# Patient Record
Sex: MALE | Race: WHITE
[De-identification: names, ages, dates, MRNs, and addresses within clinical notes are randomized per-mention and may not be internally consistent; named-entity substitution may affect disease eponyms.]

---

## 2020-07-02 ENCOUNTER — HOSPITAL ENCOUNTER (INPATIENT)
Dept: HOSPITAL 47 - EC | Age: 80
LOS: 9 days | DRG: 246 | End: 2020-07-11
Attending: HOSPITALIST | Admitting: HOSPITALIST
Payer: MEDICARE

## 2020-07-02 VITALS — BODY MASS INDEX: 28 KG/M2

## 2020-07-02 DIAGNOSIS — T50.995A: ICD-10-CM

## 2020-07-02 DIAGNOSIS — J96.01: ICD-10-CM

## 2020-07-02 DIAGNOSIS — E78.5: ICD-10-CM
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
## 2020-07-02 DIAGNOSIS — Z66: ICD-10-CM

## 2020-07-02 DIAGNOSIS — I34.0: ICD-10-CM

## 2020-07-02 DIAGNOSIS — H91.90: ICD-10-CM

## 2020-07-02 DIAGNOSIS — N17.0: ICD-10-CM

## 2020-07-02 DIAGNOSIS — Z81.1: ICD-10-CM

## 2020-07-02 DIAGNOSIS — I45.10: ICD-10-CM

## 2020-07-02 DIAGNOSIS — Z86.010: ICD-10-CM

## 2020-07-02 DIAGNOSIS — I27.20: ICD-10-CM

## 2020-07-02 DIAGNOSIS — J98.11: ICD-10-CM

## 2020-07-02 DIAGNOSIS — S09.90XA: ICD-10-CM

## 2020-07-02 DIAGNOSIS — Z92.3: ICD-10-CM

## 2020-07-02 DIAGNOSIS — K72.00: ICD-10-CM

## 2020-07-02 DIAGNOSIS — T50.8X5A: ICD-10-CM

## 2020-07-02 DIAGNOSIS — Z87.891: ICD-10-CM

## 2020-07-02 DIAGNOSIS — I44.2: ICD-10-CM

## 2020-07-02 DIAGNOSIS — R40.2114: ICD-10-CM

## 2020-07-02 DIAGNOSIS — I25.10: ICD-10-CM

## 2020-07-02 DIAGNOSIS — I46.2: ICD-10-CM

## 2020-07-02 DIAGNOSIS — Z97.4: ICD-10-CM

## 2020-07-02 DIAGNOSIS — Z11.59: ICD-10-CM

## 2020-07-02 DIAGNOSIS — Z51.5: ICD-10-CM

## 2020-07-02 DIAGNOSIS — I25.3: ICD-10-CM

## 2020-07-02 DIAGNOSIS — T44.5X5A: ICD-10-CM

## 2020-07-02 DIAGNOSIS — Q21.0: ICD-10-CM

## 2020-07-02 DIAGNOSIS — E87.1: ICD-10-CM

## 2020-07-02 DIAGNOSIS — I21.19: Primary | ICD-10-CM

## 2020-07-02 DIAGNOSIS — Z95.5: ICD-10-CM

## 2020-07-02 DIAGNOSIS — R19.7: ICD-10-CM

## 2020-07-02 DIAGNOSIS — I23.3: ICD-10-CM

## 2020-07-02 DIAGNOSIS — E87.6: ICD-10-CM

## 2020-07-02 DIAGNOSIS — I25.5: ICD-10-CM

## 2020-07-02 DIAGNOSIS — Z79.899: ICD-10-CM

## 2020-07-02 DIAGNOSIS — E86.1: ICD-10-CM

## 2020-07-02 DIAGNOSIS — Z85.46: ICD-10-CM

## 2020-07-02 DIAGNOSIS — J18.9: ICD-10-CM

## 2020-07-02 DIAGNOSIS — I48.19: ICD-10-CM
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
## 2020-07-02 DIAGNOSIS — F03.90: ICD-10-CM

## 2020-07-02 DIAGNOSIS — Z88.0: ICD-10-CM

## 2020-07-02 DIAGNOSIS — R40.2354: ICD-10-CM

## 2020-07-02 DIAGNOSIS — R00.1: ICD-10-CM

## 2020-07-02 DIAGNOSIS — I50.41: ICD-10-CM

## 2020-07-02 DIAGNOSIS — R57.0: ICD-10-CM

## 2020-07-02 DIAGNOSIS — I11.0: ICD-10-CM

## 2020-07-02 DIAGNOSIS — N40.0: ICD-10-CM

## 2020-07-02 DIAGNOSIS — E87.4: ICD-10-CM

## 2020-07-02 LAB
ALBUMIN SERPL-MCNC: 4.1 G/DL (ref 3.5–5)
ALP SERPL-CCNC: 74 U/L (ref 38–126)
ALT SERPL-CCNC: 73 U/L (ref 4–49)
ANION GAP SERPL CALC-SCNC: 7 MMOL/L
APTT BLD: 25.9 SEC (ref 22–30)
AST SERPL-CCNC: 495 U/L (ref 17–59)
BASOPHILS # BLD AUTO: 0 K/UL (ref 0–0.2)
BASOPHILS NFR BLD AUTO: 0 %
BUN SERPL-SCNC: 16 MG/DL (ref 9–20)
CALCIUM SPEC-MCNC: 9 MG/DL (ref 8.4–10.2)
CHLORIDE SERPL-SCNC: 105 MMOL/L (ref 98–107)
CO2 SERPL-SCNC: 26 MMOL/L (ref 22–30)
EOSINOPHIL # BLD AUTO: 0.1 K/UL (ref 0–0.7)
EOSINOPHIL NFR BLD AUTO: 1 %
ERYTHROCYTE [DISTWIDTH] IN BLOOD BY AUTOMATED COUNT: 3.97 M/UL (ref 4.3–5.9)
ERYTHROCYTE [DISTWIDTH] IN BLOOD: 12.9 % (ref 11.5–15.5)
GLUCOSE BLD-MCNC: 131 MG/DL (ref 75–99)
GLUCOSE SERPL-MCNC: 133 MG/DL (ref 74–99)
HCT VFR BLD AUTO: 39.5 % (ref 39–53)
HGB BLD-MCNC: 13.5 GM/DL (ref 13–17.5)
INR PPP: 0.9 (ref ?–1.2)
LYMPHOCYTES # SPEC AUTO: 1 K/UL (ref 1–4.8)
LYMPHOCYTES NFR SPEC AUTO: 8 %
MCH RBC QN AUTO: 34 PG (ref 25–35)
MCHC RBC AUTO-ENTMCNC: 34.1 G/DL (ref 31–37)
MCV RBC AUTO: 99.5 FL (ref 80–100)
MONOCYTES # BLD AUTO: 1.1 K/UL (ref 0–1)
MONOCYTES NFR BLD AUTO: 8 %
NEUTROPHILS # BLD AUTO: 10.6 K/UL (ref 1.3–7.7)
NEUTROPHILS NFR BLD AUTO: 82 %
PLATELET # BLD AUTO: 249 K/UL (ref 150–450)
POTASSIUM SERPL-SCNC: 3.9 MMOL/L (ref 3.5–5.1)
PROT SERPL-MCNC: 6.8 G/DL (ref 6.3–8.2)
PT BLD: 9.8 SEC (ref 9–12)
SODIUM SERPL-SCNC: 138 MMOL/L (ref 137–145)
TROPONIN I SERPL-MCNC: 70.3 NG/ML (ref 0–0.03)
WBC # BLD AUTO: 13 K/UL (ref 3.8–10.6)

## 2020-07-02 PROCEDURE — 82805 BLOOD GASES W/O2 SATURATION: CPT

## 2020-07-02 PROCEDURE — 99285 EMERGENCY DEPT VISIT HI MDM: CPT

## 2020-07-02 PROCEDURE — 94640 AIRWAY INHALATION TREATMENT: CPT

## 2020-07-02 PROCEDURE — 93320 DOPPLER ECHO COMPLETE: CPT

## 2020-07-02 PROCEDURE — 93451 RIGHT HEART CATH: CPT

## 2020-07-02 PROCEDURE — 71045 X-RAY EXAM CHEST 1 VIEW: CPT

## 2020-07-02 PROCEDURE — 70450 CT HEAD/BRAIN W/O DYE: CPT

## 2020-07-02 PROCEDURE — 36415 COLL VENOUS BLD VENIPUNCTURE: CPT

## 2020-07-02 PROCEDURE — 83880 ASSAY OF NATRIURETIC PEPTIDE: CPT

## 2020-07-02 PROCEDURE — 87070 CULTURE OTHR SPECIMN AEROBIC: CPT

## 2020-07-02 PROCEDURE — 85610 PROTHROMBIN TIME: CPT

## 2020-07-02 PROCEDURE — 93005 ELECTROCARDIOGRAM TRACING: CPT

## 2020-07-02 PROCEDURE — 83735 ASSAY OF MAGNESIUM: CPT

## 2020-07-02 PROCEDURE — 33210 INSERT ELECTRD/PM CATH SNGL: CPT

## 2020-07-02 PROCEDURE — 82810 BLOOD GASES O2 SAT ONLY: CPT

## 2020-07-02 PROCEDURE — 94003 VENT MGMT INPAT SUBQ DAY: CPT

## 2020-07-02 PROCEDURE — 85025 COMPLETE CBC W/AUTO DIFF WBC: CPT

## 2020-07-02 PROCEDURE — 93306 TTE W/DOPPLER COMPLETE: CPT

## 2020-07-02 PROCEDURE — 02C03ZZ EXTIRPATION OF MATTER FROM CORONARY ARTERY, ONE ARTERY, PERCUTANEOUS APPROACH: ICD-10-PCS

## 2020-07-02 PROCEDURE — 82553 CREATINE MB FRACTION: CPT

## 2020-07-02 PROCEDURE — 82550 ASSAY OF CK (CPK): CPT

## 2020-07-02 PROCEDURE — 36600 WITHDRAWAL OF ARTERIAL BLOOD: CPT

## 2020-07-02 PROCEDURE — 93308 TTE F-UP OR LMTD: CPT

## 2020-07-02 PROCEDURE — 93458 L HRT ARTERY/VENTRICLE ANGIO: CPT

## 2020-07-02 PROCEDURE — 027034Z DILATION OF CORONARY ARTERY, ONE ARTERY WITH DRUG-ELUTING INTRALUMINAL DEVICE, PERCUTANEOUS APPROACH: ICD-10-PCS

## 2020-07-02 PROCEDURE — 4A023N7 MEASUREMENT OF CARDIAC SAMPLING AND PRESSURE, LEFT HEART, PERCUTANEOUS APPROACH: ICD-10-PCS

## 2020-07-02 PROCEDURE — 93325 DOPPLER ECHO COLOR FLOW MAPG: CPT

## 2020-07-02 PROCEDURE — 93312 ECHO TRANSESOPHAGEAL: CPT

## 2020-07-02 PROCEDURE — 94002 VENT MGMT INPAT INIT DAY: CPT

## 2020-07-02 PROCEDURE — B2111ZZ FLUOROSCOPY OF MULTIPLE CORONARY ARTERIES USING LOW OSMOLAR CONTRAST: ICD-10-PCS

## 2020-07-02 PROCEDURE — 85018 HEMOGLOBIN: CPT

## 2020-07-02 PROCEDURE — 84145 PROCALCITONIN (PCT): CPT

## 2020-07-02 PROCEDURE — 82272 OCCULT BLD FECES 1-3 TESTS: CPT

## 2020-07-02 PROCEDURE — 83605 ASSAY OF LACTIC ACID: CPT

## 2020-07-02 PROCEDURE — 84484 ASSAY OF TROPONIN QUANT: CPT

## 2020-07-02 PROCEDURE — 80048 BASIC METABOLIC PNL TOTAL CA: CPT

## 2020-07-02 PROCEDURE — 85027 COMPLETE CBC AUTOMATED: CPT

## 2020-07-02 PROCEDURE — 80053 COMPREHEN METABOLIC PANEL: CPT

## 2020-07-02 PROCEDURE — 5A1223Z PERFORMANCE OF CARDIAC PACING, CONTINUOUS: ICD-10-PCS

## 2020-07-02 PROCEDURE — 85730 THROMBOPLASTIN TIME PARTIAL: CPT

## 2020-07-02 PROCEDURE — 87205 SMEAR GRAM STAIN: CPT

## 2020-07-02 PROCEDURE — 87324 CLOSTRIDIUM AG IA: CPT

## 2020-07-02 RX ADMIN — METOPROLOL TARTRATE SCH MG: 25 TABLET, FILM COATED ORAL at 20:17

## 2020-07-02 RX ADMIN — ATORVASTATIN CALCIUM SCH MG: 80 TABLET, FILM COATED ORAL at 20:17

## 2020-07-02 NOTE — P.HPIM
History of Present Illness


patient is a pleasant 79-year-old male came with complaints of left-sided chest 

pain which started today nonexertional, associated diaphoresis and nausea.  

Patient denied any fever chills cough.  Patient is found to have ST elevations 

in the inferior leads with highly elevated troponin of 70.  Patient was 

emergently taken to Cath Lab had stenting to RCA.  Patient still has mild chest 

pain.  Denied any other significant symptoms








Review of Systems











REVIEW OF SYSTEMS: 


CONSTITUTIONAL: No fever, no malaise, no fatigue. 


HEENT: No recent visual problems or hearing problems. Denied any sore throat. 


CARDIOVASCULAR: No  orthopnea, PND, no palpitations, no syncope. 


PULMONARY: No shortness of breath, no cough, no hemoptysis. 


GASTROINTESTINAL: No diarrhea, no nausea, no vomiting, no abdominal pain. 


NEUROLOGICAL: No headaches, no weakness, no numbness. 


HEMATOLOGICAL: Denies any bleeding or petechiae. 


GENITOURINARY: Denies any burning micturition, frequency, or urgency. 


MUSCULOSKELETAL/RHEUMATOLOGICAL: Denies any joint pain, swelling, or any muscle 

pain. 


ENDOCRINE: Denies any polyuria or polydipsia. 





The rest of the 14-point review of systems is negative.








Past Medical History


Past Medical History: Cancer, Dementia, Hearing Disorder / Deafness, Hy

perlipidemia, Hypertension, Prostate Disorder


Additional Past Medical History / Comment(s): Prostate cancer with radiation 

treatments, benign colon polyps, bilateral hearing aides.


History of Any Multi-Drug Resistant Organisms: None Reported


Past Surgical History: Heart Catheterization With Stent


Additional Past Surgical History / Comment(s): Cystoscopy, colonoscopy with 

benign polypectomy, EGD as a child for foreign object, R elbow fracture with 

surgery, L hand injury with surgery.


Past Anesthesia/Blood Transfusion Reactions: No Reported Reaction


Date of Last Stent Placement:: 7/2/20


Smoking Status: Former smoker





- Past Family History


  ** Father


Additional Family Medical History / Comment(s): Father was an alcoholic.





  ** Mother


Additional Family Medical History / Comment(s): Mother was an alcoholic





Medications and Allergies


                                Home Medications











 Medication  Instructions  Recorded  Confirmed  Type


 


Atorvastatin Calcium [Lipitor] 20 mg PO HS 07/02/20 07/02/20 History


 


Beet Root 1000mg 2,000 mg PO DAILY 07/02/20 07/02/20 History


 


Cholecalciferol [Vitamin D3 (25 5,000 unit PO DAILY 07/02/20 07/02/20 History





Mcg = 1000 Iu)]    


 


Citalopram Hydrobromide [CeleXA] 20 mg PO DAILY 07/02/20 07/02/20 History


 


Donepezil [Aricept] 10 mg PO DAILY 07/02/20 07/02/20 History


 


Magnesium Oxide [Mag-Ox] 800 mg PO DAILY 07/02/20 07/02/20 History


 


Memantine HCl [Memantine HCl ER] 28 mg PO DAILY 07/02/20 07/02/20 History


 


Tamsulosin HCl [Flomax] 0.4 mg PO DAILY 07/02/20 07/02/20 History


 


Vitamin B Complex 1 tab PO DAILY 07/02/20 07/02/20 History


 


amLODIPine [Norvasc] 5 mg PO DAILY 07/02/20 07/02/20 History


 


traZODone  mg PO HS 07/02/20 07/02/20 History








                                    Allergies











Allergy/AdvReac Type Severity Reaction Status Date / Time


 


Penicillins Allergy  Rash/Hives Verified 07/02/20 11:16














Physical Exam


Vitals: 


                                   Vital Signs











  Temp Pulse Resp BP Pulse Ox


 


 07/02/20 11:00   69  16  89/59  93 L


 


 07/02/20 10:45   67  15  89/59  96


 


 07/02/20 10:30   67  20  94/77  94 L


 


 07/02/20 10:15  97.6 F  65  15  81/53  95


 


 07/02/20 07:57  98.4 F  71  18  108/77  94 L








                                Intake and Output











 07/01/20 07/02/20 07/02/20





 22:59 06:59 14:59


 


Intake Total   507


 


Balance   507


 


Intake:   


 


  IV   507


 


    Sheaths   12


 


    Sodium Chloride 0.9% 1,   80





    000 ml @ 75 mls/hr IV .   





    [id number] Count includes the [facility] Rx#:[id number]   


 


Other:   


 


  Weight   71.214 kg

















PHYSICAL EXAMINATION: 





GENERAL: The patient is alert and oriented x3, not in any acute distress. Well 

developed, well nourished. 


HEENT: Pupils are round and equally reacting to light. EOMI. No scleral icterus.

 No conjunctival pallor. Normocephalic, atraumatic. No pharyngeal erythema. No 

thyromegaly. 


CARDIOVASCULAR: S1 and S2 present. No murmurs, rubs, or gallops. 


PULMONARY: Chest is clear to auscultation, no wheezing or crackles. 


ABDOMEN: Soft, nontender, nondistended, normoactive bowel sounds. No palpable 

organomegaly. 


MUSCULOSKELETAL: No joint swelling or deformity.


EXTREMITIES: No cyanosis, clubbing, or pedal edema. 


NEUROLOGICAL: Gross neurological examination did not reveal any focal deficits. 


SKIN: No rashes. 

















Results


CBC & Chem 7: 


                                 07/02/20 08:02





                                 07/02/20 08:02


Labs: 


                  Abnormal Lab Results - Last 24 Hours (Table)











  07/02/20 07/02/20 07/02/20 Range/Units





  08:02 08:02 08:02 


 


WBC  13.0 H    (3.8-10.6)  k/uL


 


RBC  3.97 L    (4.30-5.90)  m/uL


 


Neutrophils #  10.6 H    (1.3-7.7)  k/uL


 


Monocytes #  1.1 H    (0-1.0)  k/uL


 


Glucose   133 H   (74-99)  mg/dL


 


POC Glucose (mg/dL)     (75-99)  mg/dL


 


AST   495 H   (17-59)  U/L


 


ALT   73 H   (4-49)  U/L


 


Total Creatine Kinase    2933 H*  ()  U/L


 


CK-MB (CK-2)    127.0 H  (0.0-2.4)  ng/mL


 


Troponin I    70.300 H*  (0.000-0.034)  ng/mL














  07/02/20 Range/Units





  10:42 


 


WBC   (3.8-10.6)  k/uL


 


RBC   (4.30-5.90)  m/uL


 


Neutrophils #   (1.3-7.7)  k/uL


 


Monocytes #   (0-1.0)  k/uL


 


Glucose   (74-99)  mg/dL


 


POC Glucose (mg/dL)  131 H  (75-99)  mg/dL


 


AST   (17-59)  U/L


 


ALT   (4-49)  U/L


 


Total Creatine Kinase   ()  U/L


 


CK-MB (CK-2)   (0.0-2.4)  ng/mL


 


Troponin I   (0.000-0.034)  ng/mL














Thrombosis Risk Factor Assmnt





- Choose All That Apply


Any of the Below Risk Factors Present?: Yes


Each Factor Represents 1 point: Acute MI


Other Risk Factors: Yes


Each Risk Factor Represents 2 Points: Patient confined to bed, Malignancy


Each Risk Factor Represents 3 Points: Age 75 years or older


Other congenital or acquired thrombophilia - If yes, enter type in comment: No


Thrombosis Risk Factor Assessment Total Risk Factor Score: 8


Thrombosis Risk Factor Assessment Level: High Risk





Assessment and Plan


Plan: 


-acute inferior wall ST elevation myocardial infarction: Patient is status post 

cardiac catheterization stenting of the RCA.  Continue with the dual antip

latelet therapy patient is on low-dose of lisinopril but blood pressure is low 

we'll closely monitor the blood pressure and if it's better patient will receive

 this medication patient will be continued on beta blocker for heart rate can 

tolerate.


-essential hypertension: Patient is presently hypotensive further management as 

mentioned above


-Hyperlipidemia next and having benign prostatic hypertrophy


-Dementia as per the documentation also etiology is not clear

## 2020-07-02 NOTE — CC
CARDIAC CATHETERIZATION REPORT



INDICATION:

Acute inferior wall myocardial infarction.



PROCEDURE NOTE:

After obtaining informed consent, left heart catheterization and coronary angiogram

were performed via the right femoral artery.  The patient has very feeble femoral

pulses bilaterally and distally.  I initially attempted vascular access on the right

femoral artery and after placing the sheath, we were not able to advance the regular

wire across the vessel.  I used a Glidewire, but even the Glidewire we were not able to

pass the Glidewire across the iliac vessels and an angiogram we obtained showed severe

stenosis and hence we attempted vascular access on the left side and we obtained

vascular access without any problems, and we were able to pass the Glidewire all the

way into the ascending aorta.  However, we were not able to advance the right and left

Barbra catheter into the aorta.  On the right side, cath by Interventional

Cardiologist placed a long 23 cm sheath using a stiff Glidewire as support and I

completed the catheterization through that sheath.  The patient throughout this

remained comfortable at rest and did not complain of anything.



FINDINGS:



HEMODYNAMICS:

Central aortic pressure 90/50.



LEFT VENTRICULOGRAM:

Left ventriculogram is not performed.



ANGIOGRAPHIC DATA:

1. RIGHT CORONARY ARTERY:  Right coronary artery is totally occluded in its

    midportion.

2. Left main coronary artery is a normal-sized vessel and is free of stenosis.

    Divides into left anterior descending coronary artery and circumflex coronary

    artery.

3. LEFT ANTERIOR DESCENDING CORONARY ARTERY: LAD and its branches, circumflex coronary

    artery and branches are free of significant stenosis.



CONCLUSION:

Acute occlusion of the right coronary artery.



PLAN:

Patient will undergo angioplasty of the same.  Patient received moderate conscious

sedation.  Total sedation time was 28 minutes.





MMODL / IJN: [id number] / Job#: [id number]

## 2020-07-02 NOTE — PTCA
PERCUTANEOUSTRANS CORORONARY ANGIOGRAPHY



DATE OF SERVICE:

July 2, 2020



PERFORMING PHYSICIAN:

Severo Payne MD.



PROCEDURE PERFORMED:

1. Successful stenting of the mid right coronary artery using 2.75 x 18 mm Xience ANDRES

    with an excellent angiographic results and reduction of stenosis from 100% to 0%.

2. Aspiration thrombectomy from the right coronary artery.

3. Left heart catheterization.



INDICATION:

This is a 79-year-old gentleman with hypertension and dyslipidemia who was brought by

ambulance with acute inferior ST-elevation myocardial infarction.  He underwent an

emergent heart catheterization by Dr. Fry and was found to have an acute total

occlusion of the right coronary artery.  Because of that, an emergent percutaneous

coronary intervention was advised.



APPROACH:

Right common femoral artery.



COMPLICATION:

None.



LEVEL OF SEDATION:

Moderate with sedation length of 27 minutes.

Door to balloon is 56 minutes.



PROCEDURE DESCRIPTION:

Please refer to diagnostic heart catheterization was performed by Dr. Fry earlier

today.



Anticoagulation was initiated using Angiomax.



Subsequently, I did engage the right coronary artery using JR4 guide.  I did cross the

lesion in the mid right coronary artery using a run-through wire.  After that, I did

aspiration thrombectomy from the right coronary artery with the extraction of white

clot.  Subsequently, I did the balloon angioplasty of the right coronary artery using

2.5 x 12 mm balloon where the balloon was inflated under 12 atmospheres for 20 seconds.

After that, I did place 2.75 x 18 mm Xience ANDRES where the stent was positioned under

fluoroscopy guidance and deployed under 18 atmospheres for 20 seconds.  The following

angiogram showed excellent angiographic results and the procedure was completed without

any complication.



After that, I did cross the aortic valve to the left ventricle using 6-Chinese pigtail

catheter.



The procedure was completed without any complication.



POSTPROCEDURE MANAGEMENT:

1. Dual anti-platelet therapy.

2. Risk factors modifications.

3. Assess the lesions in the iliac artery and consider percutaneous transluminal

    angioplasty of bilateral iliacs if the patient is symptomatic.

4. Follow up with the patient.





MMODL / IJN: [id number] / Job#: [id number]

## 2020-07-02 NOTE — CONS
OUMOU Chopra is a 79-year-old gentleman with history of hypertension who presented to

hospital with acute inferior wall myocardial infarction.  The patient stated that this

morning he started having severe chest pressure, moderate to severe intensity that

radiated down both arms. An EKG transmitted by EMS showed evidence of acute inferior

wall myocardial infarction.  He was brought to the cath lab for primary angioplasty.

At the time of my evaluation in the lab, the patient appeared comfortable at rest,

hemodynamically stable, but was still having pain.



Past medical history is significant for hypertension.



He is allergic to PENICILLIN.



He stated that he was on blood pressure medications, but we do not have the list.



FAMILY HISTORY:

Negative for premature coronary artery disease.



SOCIAL HISTORY:

Negative for smoking, EtOH abuse, or drug abuse.



REVIEW OF SYSTEMS:

HEENT is unremarkable.

Cardiac as above.

Respiratory negative.

GI negative.

 negative.

Allergy/Immunology negative.

Skin negative.

Musculoskeletal significant for arthritis.  Psychosocial negative.

Endocrine negative.

CONSTITUTIONAL negative.

Derm negative.

ONCOLOGICAL:  Negative

CNS negative.



Rest of the system review is not relevant.



EXAM:

Comfortable at rest.  Heart rate is 108, blood pressure is 90/50. Respiratory rate is

18.  There is no jugular venous distention.  Carotid upstroke is normal.  There is no

bruit.  Chest exam reveals good air entry bilaterally.  Heart exam reveals first and

second heart sounds.  No gallop.  No murmur.  Abdomen soft. Exam of  extremities did

not reveal any edema.  Peripheral pulses are diminished.



Labs show a hemoglobin of 13.5, platelet count is 249, potassium is 3.9, creatinine is

0.8. His  troponin is 70.  AST is 495, ALT is 73.



ASSESSMENT:

Acute inferior wall myocardial infarction.



PLAN:

Patient will undergo emergent cardiac catheterization.





MMODL / IJN: [id number] / Job#: [id number]

## 2020-07-02 NOTE — ECHOF
Referral Reason:STEMI



MEASUREMENTS

--------

HEIGHT: 170.2 cm

WEIGHT: 71.2 kg

BP: 82/58

RVIDd:   3.6 cm     (< 3.3)

IVSd:   1.2 cm     (0.6 - 1.1)

LVIDd:   5.6 cm     (3.9 - 5.3)

LVPWd:   1.0 cm     (0.6 - 1.1)

IVSs:   1.6 cm

LVIDs:   4.4 cm

LVPWs:   1.2 cm

LA Diam:   3.7 cm     (2.7 - 3.8)

LAESV Index (A-L):   32.07 ml/m

Ao Diam:   4.0 cm     (2.0 - 3.7)

AV Cusp:   2.2 cm     (1.5 - 2.6)

MV EXCURSION:   16.659 mm     (> 18.000)

MV EF SLOPE:   107 mm/s     (70 - 150)

EPSS:   0.8 cm

MV E Nicko:   1.12 m/s

MV DecT:   216 ms

MV A Nicko:   0.96 m/s

MV E/A Ratio:   1.17 

RAP:   15.00 mmHg

RVSP:   42.27 mmHg







FINDINGS

--------

Sinus rhythm.

This was a technically good study.

The left ventricular size is normal.   There is borderline concentric left ventricular hypertrophy.  
 Overall left ventricular systolic function is mildly impaired with, an EF between 45 - 50 %.   Basal
 posterior LV wall motion is hypokinetic.    Basal inferior LV wall motion is hypokinetic.    Basal i
nferoseptal LV wall motion is hypokinetic.

The right ventricle is mildly enlarged.

LA is midly dilated 29-33ml/m2.

The right atrium is normal in size.

Interatrial and interventricular septum intact.

There is mild aortic valve sclerosis.

There is trace mitral regurgitation.

Mild tricuspid regurgitation present.   There is mild pulmonary hypertension.   The right ventricular
 systolic pressure, as measured by Doppler, is 42.27mmHg.

Trace/mild (physiologic)  pulmonic regurgitation.

The aortic root is dilated measuring 4.0cm.

Normal inferior vena cava with less than 50% inspiratory collapse consistent with estimated right atr
ial pressure of 15 mmHg.   The inferior vena cava is mildly dilated.

There is no pericardial effusion.



CONCLUSIONS

--------

1. Sinus rhythm.

2. This was a technically good study.

3. The left ventricular size is normal.

4. There is borderline concentric left ventricular hypertrophy.

5. Overall left ventricular systolic function is mildly impaired with, an EF between 45 - 50 %.

6. Basal posterior LV wall motion is hypokinetic.

7. Basal inferior LV wall motion is hypokinetic.

8. Basal inferoseptal LV wall motion is hypokinetic.

9. The right ventricle is mildly enlarged.

10. LA is midly dilated 29-33ml/m2.

11. The right atrium is normal in size.

12. Interatrial and interventricular septum intact.

13. There is mild aortic valve sclerosis.

14. There is trace mitral regurgitation.

15. Mild tricuspid regurgitation present.

16. There is mild pulmonary hypertension.

17. The right ventricular systolic pressure, as measured by Doppler, is 42.27mmHg.

18. Trace/mild (physiologic)  pulmonic regurgitation.

19. The aortic root is dilated measuring 4.0cm.

20. Normal inferior vena cava with less than 50% inspiratory collapse consistent with estimated right
 atrial pressure of 15 mmHg.

21. The inferior vena cava is mildly dilated.

22. There is no pericardial effusion.





SONOGRAPHER: Samreen Eden RDCS

## 2020-07-03 LAB
ANION GAP SERPL CALC-SCNC: 5 MMOL/L
ANION GAP SERPL CALC-SCNC: 9 MMOL/L
BASOPHILS # BLD AUTO: 0 K/UL (ref 0–0.2)
BASOPHILS NFR BLD AUTO: 0 %
BUN SERPL-SCNC: 27 MG/DL (ref 9–20)
BUN SERPL-SCNC: 37 MG/DL (ref 9–20)
CALCIUM SPEC-MCNC: 8.9 MG/DL (ref 8.4–10.2)
CALCIUM SPEC-MCNC: 9.1 MG/DL (ref 8.4–10.2)
CHLORIDE SERPL-SCNC: 102 MMOL/L (ref 98–107)
CHLORIDE SERPL-SCNC: 103 MMOL/L (ref 98–107)
CO2 SERPL-SCNC: 22 MMOL/L (ref 22–30)
CO2 SERPL-SCNC: 24 MMOL/L (ref 22–30)
EOSINOPHIL # BLD AUTO: 0.1 K/UL (ref 0–0.7)
EOSINOPHIL NFR BLD AUTO: 0 %
ERYTHROCYTE [DISTWIDTH] IN BLOOD BY AUTOMATED COUNT: 4.15 M/UL (ref 4.3–5.9)
ERYTHROCYTE [DISTWIDTH] IN BLOOD BY AUTOMATED COUNT: 4.18 M/UL (ref 4.3–5.9)
ERYTHROCYTE [DISTWIDTH] IN BLOOD: 13 % (ref 11.5–15.5)
ERYTHROCYTE [DISTWIDTH] IN BLOOD: 13.1 % (ref 11.5–15.5)
GLUCOSE SERPL-MCNC: 155 MG/DL (ref 74–99)
GLUCOSE SERPL-MCNC: 188 MG/DL (ref 74–99)
HCT VFR BLD AUTO: 41.8 % (ref 39–53)
HCT VFR BLD AUTO: 43.1 % (ref 39–53)
HGB BLD-MCNC: 13.5 GM/DL (ref 13–17.5)
HGB BLD-MCNC: 14.3 GM/DL (ref 13–17.5)
LYMPHOCYTES # SPEC AUTO: 1.1 K/UL (ref 1–4.8)
LYMPHOCYTES NFR SPEC AUTO: 6 %
MCH RBC QN AUTO: 32.4 PG (ref 25–35)
MCH RBC QN AUTO: 34.2 PG (ref 25–35)
MCHC RBC AUTO-ENTMCNC: 32.2 G/DL (ref 31–37)
MCHC RBC AUTO-ENTMCNC: 33.1 G/DL (ref 31–37)
MCV RBC AUTO: 100.6 FL (ref 80–100)
MCV RBC AUTO: 103.2 FL (ref 80–100)
MONOCYTES # BLD AUTO: 1.5 K/UL (ref 0–1)
MONOCYTES NFR BLD AUTO: 8 %
NEUTROPHILS # BLD AUTO: 16.5 K/UL (ref 1.3–7.7)
NEUTROPHILS NFR BLD AUTO: 84 %
PLATELET # BLD AUTO: 274 K/UL (ref 150–450)
PLATELET # BLD AUTO: 289 K/UL (ref 150–450)
POTASSIUM SERPL-SCNC: 4.2 MMOL/L (ref 3.5–5.1)
POTASSIUM SERPL-SCNC: 4.3 MMOL/L (ref 3.5–5.1)
SODIUM SERPL-SCNC: 131 MMOL/L (ref 137–145)
SODIUM SERPL-SCNC: 134 MMOL/L (ref 137–145)
WBC # BLD AUTO: 18.6 K/UL (ref 3.8–10.6)
WBC # BLD AUTO: 19.6 K/UL (ref 3.8–10.6)

## 2020-07-03 RX ADMIN — ATORVASTATIN CALCIUM SCH MG: 80 TABLET, FILM COATED ORAL at 21:28

## 2020-07-03 RX ADMIN — Medication SCH MG: at 08:18

## 2020-07-03 RX ADMIN — HEPARIN SODIUM SCH UNIT: 5000 INJECTION, SOLUTION INTRAVENOUS; SUBCUTANEOUS at 21:34

## 2020-07-03 RX ADMIN — METOPROLOL TARTRATE SCH MG: 25 TABLET, FILM COATED ORAL at 08:18

## 2020-07-03 RX ADMIN — CITALOPRAM HYDROBROMIDE SCH MG: 20 TABLET ORAL at 08:18

## 2020-07-03 RX ADMIN — TAMSULOSIN HYDROCHLORIDE SCH MG: 0.4 CAPSULE ORAL at 08:19

## 2020-07-03 RX ADMIN — MEMANTINE HYDROCHLORIDE SCH MG: 10 TABLET ORAL at 21:28

## 2020-07-03 RX ADMIN — CLOPIDOGREL BISULFATE SCH MG: 75 TABLET ORAL at 08:18

## 2020-07-03 RX ADMIN — MEMANTINE HYDROCHLORIDE SCH MG: 10 TABLET ORAL at 08:18

## 2020-07-03 RX ADMIN — METOPROLOL TARTRATE SCH MG: 25 TABLET, FILM COATED ORAL at 21:28

## 2020-07-03 NOTE — XR
EXAMINATION TYPE: XR chest 1V portable

 

DATE OF EXAM: 7/3/2020

 

COMPARISON: NONE

 

HISTORY: Redness of breath

 

TECHNIQUE: Single frontal view of the chest is obtained.

 

FINDINGS:  There is diffuse prominence of the interstitium, patchy perihilar airspace disease is note
d. There is no pneumothorax or pleural effusion. There is overlying artifact, cardiac leads. Heart si
ze is at the upper limit of normal. The aorta is dense. Bone mineralization is normal.

 

IMPRESSION:  Findings could represent interstitial and early alveolar edema, correlate to exclude pne
umonia.

## 2020-07-03 NOTE — ECHOF
Referral Reason:Chest pain and cardiomyopathy



MEASUREMENTS

--------

HEIGHT: 170.2 cm

WEIGHT: 68.0 kg

BP: 109/67

IVSd:   1.2 cm     (0.6 - 1.1)

LVIDd:   4.3 cm     (3.9 - 5.3)

LVPWd:   1.5 cm     (0.6 - 1.1)

IVSs:   1.6 cm

LVIDs:   3.3 cm

LVPWs:   1.2 cm







FINDINGS

--------

This was a technically difficult study with suboptimal apical views.   Limited Study

Overall left ventricular systolic function is mildly impaired with, an EF between 45 - 50 %.   Basal 
posterior LV wall motion is hypokinetic.    Basal inferior LV wall motion is hypokinetic.

5.0mg of Lumason was utilized for enhancement of images

There is no pericardial effusion.



CONCLUSIONS

--------

1. This was a technically difficult study with suboptimal apical views.

2. Limited Study

3. Overall left ventricular systolic function is mildly impaired with, an EF between 45 - 50 %.

4. Basal posterior LV wall motion is hypokinetic.

5. Basal inferior LV wall motion is hypokinetic.

6. 5.0mg of Lumason was utilized for enhancement of images

7. There is no pericardial effusion.





SONOGRAPHER: Leyla Winchester RDCS

## 2020-07-03 NOTE — P.PN
Subjective


Progress Note Date: 07/03/20


This is a 79-year-old gentleman with history of hypertension who presented with 

inferior wall myocardial infarction.  Patient had a cardiac catheterization and 

stent placement of the mid RCA.  His CPK was high in the troponin was in the 

range of 77 on admission.  His echo Cardigan showed an ejection fraction of 45%.

 Patient is complaining of some congestion and seemed to be mild short of 

breath.  Chest x-ray showed evidence of congestive heart failure.  Heart 

appeared to be regular.  No peripheral edema.  I'm going to start him on IV 

Lasix 40 mg and start him by mouth Lasix.  We'll also give potassium supplement.

 Rest of the medication to be continued.  We'll continue follow his input and 

output.  Follow blood work tomorrow.  Further condition depend upon the clinical

course.  We'll continue to monitor him in intensive care unit








Objective





- Vital Signs


Vital signs: 


                                   Vital Signs











Temp  98.2 F   07/03/20 04:00


 


Pulse  75   07/03/20 08:00


 


Resp  30 H  07/03/20 08:00


 


BP  97/62   07/03/20 08:00


 


Pulse Ox  93 L  07/03/20 08:00








                                 Intake & Output











 07/02/20 07/03/20 07/03/20





 18:59 06:59 18:59


 


Intake Total 1032 440 


 


Output Total 200 500 


 


Balance 832 -60 


 


Weight 71.214 kg 76.4 kg 


 


Intake:   


 


    


 


    Sheaths 12  


 


    Sodium Chloride 0.9% 1, 455  





    000 ml @ 75 mls/hr IV .   





    [id number] [facility] Rx#:[id number]   


 


  Oral 150 440 


 


Output:   


 


  Urine 200 500 


 


Other:   


 


  Voiding Method Urinal Bedside Commode 


 


  # Voids 1 2 0


 


  # Bowel Movements  2 














- Exam





GENERAL EXAM: Patient is alert and oriented and appears to be in mild 

respiratory distress and cough


HEENT: Normocephalic. Normal reaction of pupils, equal size, normal range of 

extraocular motion. No erythema or exudates in the throat.


NECK: No masses, no nuchal rigidity.


CHEST: No chest wall deformity.


LUNGS: Show expiratory wheezes and rhonchi


HEART: [S1 and S2 normal


ABDOMEN: No hepatosplenomegaly, normal bowel sounds, no guarding or rigidity.


SKIN: No rashes


CENTRAL NERVOUS SYSTEM: No focal deficits.


EXTREMITIES: No cyanosis, clubbing or edema.





- Labs


CBC & Chem 7: 


                                 07/03/20 04:46





                                 07/03/20 04:46


Labs: 


                  Abnormal Lab Results - Last 24 Hours (Table)











  07/02/20 07/03/20 07/03/20 Range/Units





  10:42 04:46 04:46 


 


WBC    18.6 H  (3.8-10.6)  k/uL


 


RBC    4.18 L  (4.30-5.90)  m/uL


 


MCV    103.2 H  (80.0-100.0)  fL


 


Sodium   134 L   (137-145)  mmol/L


 


BUN   27 H   (9-20)  mg/dL


 


Creatinine   1.49 H   (0.66-1.25)  mg/dL


 


Glucose   188 H   (74-99)  mg/dL


 


POC Glucose (mg/dL)  131 H    (75-99)  mg/dL














Assessment and Plan


(1) Acute transmural inferior wall MI


Current Visit: Yes   Status: Acute   Code(s): I21.19 - STEMI INVOLVING OTH 

CORONARY ARTERY OF INFERIOR WALL   SNOMED Code(s): 59503305


   





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


Current Visit: Yes   Status: Acute   Code(s): I50.41 - ACUTE COMBINED SYSTOLIC 

AND DIASTOLIC (CONGESTIVE) HRT FAIL   SNOMED Code(s): 548113216958874


   





(3) Essential hypertension


Current Visit: Yes   Status: Acute   Code(s): I10 - ESSENTIAL (PRIMARY) 

HYPERTENSION   SNOMED Code(s): 33653677


   


Plan: 


P.m. dose of IV Lasix and start him on by mouth Lasix and potassium.  Continue 

rest of the medication.  Continue ICU monitoring.  Echocardiogram

## 2020-07-04 LAB
ANION GAP SERPL CALC-SCNC: 7 MMOL/L
BASOPHILS # BLD AUTO: 0 K/UL (ref 0–0.2)
BASOPHILS NFR BLD AUTO: 0 %
BUN SERPL-SCNC: 43 MG/DL (ref 9–20)
CALCIUM SPEC-MCNC: 8.6 MG/DL (ref 8.4–10.2)
CHLORIDE SERPL-SCNC: 101 MMOL/L (ref 98–107)
CO2 SERPL-SCNC: 24 MMOL/L (ref 22–30)
EOSINOPHIL # BLD AUTO: 0.1 K/UL (ref 0–0.7)
EOSINOPHIL NFR BLD AUTO: 1 %
ERYTHROCYTE [DISTWIDTH] IN BLOOD BY AUTOMATED COUNT: 3.85 M/UL (ref 4.3–5.9)
ERYTHROCYTE [DISTWIDTH] IN BLOOD: 13.1 % (ref 11.5–15.5)
GLUCOSE SERPL-MCNC: 139 MG/DL (ref 74–99)
HCT VFR BLD AUTO: 39.2 % (ref 39–53)
HGB BLD-MCNC: 13.1 GM/DL (ref 13–17.5)
LYMPHOCYTES # SPEC AUTO: 1.1 K/UL (ref 1–4.8)
LYMPHOCYTES NFR SPEC AUTO: 6 %
MCH RBC QN AUTO: 34 PG (ref 25–35)
MCHC RBC AUTO-ENTMCNC: 33.4 G/DL (ref 31–37)
MCV RBC AUTO: 101.7 FL (ref 80–100)
MONOCYTES # BLD AUTO: 1.1 K/UL (ref 0–1)
MONOCYTES NFR BLD AUTO: 6 %
NEUTROPHILS # BLD AUTO: 15.5 K/UL (ref 1.3–7.7)
NEUTROPHILS NFR BLD AUTO: 86 %
PLATELET # BLD AUTO: 269 K/UL (ref 150–450)
POTASSIUM SERPL-SCNC: 4 MMOL/L (ref 3.5–5.1)
SODIUM SERPL-SCNC: 132 MMOL/L (ref 137–145)
WBC # BLD AUTO: 18.1 K/UL (ref 3.8–10.6)

## 2020-07-04 RX ADMIN — CITALOPRAM HYDROBROMIDE SCH MG: 20 TABLET ORAL at 10:22

## 2020-07-04 RX ADMIN — METOPROLOL TARTRATE SCH MG: 25 TABLET, FILM COATED ORAL at 11:39

## 2020-07-04 RX ADMIN — Medication SCH MG: at 10:21

## 2020-07-04 RX ADMIN — MEMANTINE HYDROCHLORIDE SCH MG: 10 TABLET ORAL at 10:21

## 2020-07-04 RX ADMIN — ATORVASTATIN CALCIUM SCH MG: 80 TABLET, FILM COATED ORAL at 20:25

## 2020-07-04 RX ADMIN — TAMSULOSIN HYDROCHLORIDE SCH MG: 0.4 CAPSULE ORAL at 10:21

## 2020-07-04 RX ADMIN — METOPROLOL TARTRATE SCH MG: 25 TABLET, FILM COATED ORAL at 20:26

## 2020-07-04 RX ADMIN — HEPARIN SODIUM SCH UNIT: 5000 INJECTION, SOLUTION INTRAVENOUS; SUBCUTANEOUS at 20:27

## 2020-07-04 RX ADMIN — HEPARIN SODIUM SCH UNIT: 5000 INJECTION, SOLUTION INTRAVENOUS; SUBCUTANEOUS at 11:39

## 2020-07-04 RX ADMIN — FAMOTIDINE SCH MG: 20 TABLET, FILM COATED ORAL at 10:20

## 2020-07-04 RX ADMIN — MEMANTINE HYDROCHLORIDE SCH MG: 10 TABLET ORAL at 20:26

## 2020-07-04 RX ADMIN — CLOPIDOGREL BISULFATE SCH MG: 75 TABLET ORAL at 11:39

## 2020-07-04 RX ADMIN — LEVOFLOXACIN SCH MG: 250 TABLET, FILM COATED ORAL at 20:26

## 2020-07-04 NOTE — P.PN
Subjective


Progress Note Date: 07/04/20


This is a 79-year-old gentleman with history of hypertension who presented with 

inferior wall myocardial infarction.  Patient had a cardiac catheterization and 

stent placement of the mid RCA.  His CPK was high in the troponin was in the 

range of 77 on admission.  His echo Cardigan showed an ejection fraction of 45%.

 Patient is complaining of some congestion and seemed to be mild short of 

breath.  Chest x-ray showed evidence of congestive heart failure.  Heart 

appeared to be regular.  No peripheral edema.  I'm going to start him on IV 

Lasix 40 mg and start him by mouth Lasix.  We'll also give potassium supplement.

 Rest of the medication to be continued.  We'll continue follow his input and 

output.  Follow blood work tomorrow.  Further condition depend upon the clinical

course.  We'll continue to monitor him in intensive care unit








07/04/2020: This patient is admitted to the hospital with inferior wall MI, 

probably subacute.  Patient had stent placement.  His eldest was an ejection 

fraction of 40% to 45% with hypokinesis of the inferior wall.  Patient has been 

short of breath.  A chest x-ray showed some evidence of possible pulmonary edema

and possible atypical pneumonia involving the right lower lobe.  Patient does 

have white count elevation.  Clinically he does have a slow murmur in the apical

area and also left sternal border.  Rule out possibility of mitral 

regurgitation.  I'm going to repeat the echocardiogram.  His urine output is 

fair.  Pulmonary consult is requested regarding possible pneumonia.





Objective





- Vital Signs


Vital signs: 


                                   Vital Signs











Temp  97.1 F L  07/04/20 04:00


 


Pulse  81   07/04/20 07:00


 


Resp  21   07/04/20 07:00


 


BP  92/62   07/04/20 07:00


 


Pulse Ox  89 L  07/04/20 07:00








                                 Intake & Output











 07/03/20 07/04/20 07/04/20





 18:59 06:59 18:59


 


Intake Total  600 


 


Output Total  150 


 


Balance  450 


 


Weight  77.2 kg 


 


Intake:   


 


  IV  100 


 


    Levofloxacin 500Mg-D5w  100 





    Pmx 500 mg In Dextrose/   





    Water 1 100ml.bag @ 100   





    mls/hr IVPB DAILY@2000   





    [facility] Rx#:[id number]   


 


  Oral  500 


 


Output:   


 


  Urine  150 


 


Other:   


 


  Voiding Method  Bedside Commode 


 


  # Voids 1 0 1


 


  # Bowel Movements 1 1 1














- Exam





GENERAL EXAM: Patient is alert and oriented and appears to be in mild 

respiratory distress and cough


HEENT: Normocephalic. Normal reaction of pupils, equal size, normal range of 

extraocular motion. No erythema or exudates in the throat.


NECK: No masses, no nuchal rigidity.


CHEST: No chest wall deformity.


LUNGS: Appear relatively clear compared to yesterday


HEART: S1, S2 heard.  Systolic murmur heard at the apex and left sternal border


ABDOMEN: No hepatosplenomegaly, normal bowel sounds, no guarding or rigidity.


SKIN: No rashes


CENTRAL NERVOUS SYSTEM: No focal deficits.


EXTREMITIES: No cyanosis, clubbing or edema.





- Labs


CBC & Chem 7: 


                                 07/04/20 05:02





                                 07/04/20 05:02


Labs: 


                  Abnormal Lab Results - Last 24 Hours (Table)











  07/03/20 07/03/20 07/03/20 Range/Units





  19:45 19:45 19:45 


 


WBC  19.6 H    (3.8-10.6)  k/uL


 


RBC  4.15 L    (4.30-5.90)  m/uL


 


MCV  100.6 H    (80.0-100.0)  fL


 


Neutrophils #  16.5 H    (1.3-7.7)  k/uL


 


Monocytes #  1.5 H    (0-1.0)  k/uL


 


Sodium   131 L   (137-145)  mmol/L


 


BUN   37 H   (9-20)  mg/dL


 


Creatinine   1.48 H   (0.66-1.25)  mg/dL


 


Glucose   155 H   (74-99)  mg/dL


 


Procalcitonin    0.23 H  (0.02-0.09)  ng/mL














  07/04/20 07/04/20 Range/Units





  05:02 05:02 


 


WBC  18.1 H   (3.8-10.6)  k/uL


 


RBC  3.85 L   (4.30-5.90)  m/uL


 


MCV  101.7 H   (80.0-100.0)  fL


 


Neutrophils #  15.5 H   (1.3-7.7)  k/uL


 


Monocytes #  1.1 H   (0-1.0)  k/uL


 


Sodium   132 L  (137-145)  mmol/L


 


BUN   43 H  (9-20)  mg/dL


 


Creatinine   1.53 H  (0.66-1.25)  mg/dL


 


Glucose   139 H  (74-99)  mg/dL


 


Procalcitonin    (0.02-0.09)  ng/mL














Assessment and Plan


(1) Acute transmural inferior wall MI


Current Visit: Yes   Status: Acute   Code(s): I21.19 - STEMI INVOLVING OTH 

CORONARY ARTERY OF INFERIOR WALL   SNOMED Code(s): 01653277


   





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


Current Visit: Yes   Status: Acute   Code(s): I50.41 - ACUTE COMBINED SYSTOLIC 

AND DIASTOLIC (CONGESTIVE) HRT FAIL   SNOMED Code(s): 276609545920456


   





(3) Essential hypertension


Current Visit: Yes   Status: Acute   Code(s): I10 - ESSENTIAL (PRIMARY) 

HYPERTENSION   SNOMED Code(s): 75465758


   


Plan: 


P.m. dose of IV Lasix and start him on by mouth Lasix and potassium.  Continue 

rest of the medication.  Continue ICU monitoring.  Echocardiogram.





Patient seemed to be relatively better.  Chest x-ray however shows vascular 

congestion and possible pneumonia.  Does have a systolic murmur at the apex.  

I'm going to repeat echocardiogram to assess for any mitral regurgitation.  

Pulmonary consult is also requested.  Continue current medical therapy

## 2020-07-04 NOTE — P.PN
Subjective





This is a pleasant 79 years old male with past medical history of hypertension, 

hyperlipidemia, dementia, prostate cancer status post radiotherapy , patient 

presents because of chest pain found to have STEMI, he underwent cardiac cath 

status post stent placement in the right coronary artery.  He is in the ICU.


  His creatinine increased today to 1.4, WBC is high at 18.6 and 19.6 K, sodium 

131, his chest x-ray showing pulmonary congestion with possible interstitial 

pneumonia


He saturating 90s on 7 L/m oxygen via nasal cannula.


Cardiologist already given 1 dose of Lasix IV and continued on 40 mg by mouth 

daily, 1 going to give him another dose of Lasix.  Blood pressure is on the low 

normal but he is tachypneic at 35.  A but that side nurse his breathing is 

little better in the evening than in the morning and he did not need BiPAP for 

now.


He is also on aspirin and Plavix, he is on metoprolol 12.5 mg and lisinopril 2.5

mg, we going to hold lisinopril for now


Also call pulmonary and nephrology consult





07/04/2020


Patient breathing easier but however his significantly tachypneic and mid 20s, 

he saturating 94% on 9 L oxygen via nasal cannula.  His blood pressure 96/57 and

heart rate 77.


He still bleeding from dyspnea but no chest pain, he has little cough with clear

phlegm.


This morning he was trying to get out of bed to the restroom by himself when he 

fell on hit his head in the forehead area.


Chest x-ray: Pulmonary edema, atypical pneumonia, some right lower lobe 

pneumonia


WBC this morning is 18.1 K with slight improvement.  Sodium 132, creatinine 

stable at 1.5.  Procol stone and is elevated at 0.23


Patient is currently on by mouth Lasix, lisinopril is held, cardiologist 

recommended to continue with metoprolol 12.5, we going to check with cardiology 

about holding parameters.  Continue with Levaquin.


Ordered EKG








Review of systems


CONSTITUTIONAL: No fever, no malaise, no fatigue. 


HEENT: No recent visual problems or hearing problems. Denied any sore throat. 


CARDIOVASCULAR: no palpitations, no syncope. 


PULMONARY: no hemoptysis. 


GASTROINTESTINAL: No diarrhea, no nausea, no vomiting, no abdominal pain. 

Normoactive bowel sounds. 


NEUROLOGICAL: No headaches, no weakness, no numbness. 


HEMATOLOGICAL: Denies any bleeding or petechiae. 


GENITOURINARY: Denies any burning micturition, frequency, or urgency. 


MUSCULOSKELETAL/RHEUMATOLOGICAL: Denies any joint pain, swelling, or any muscle 

pain. 


ENDOCRINE: Denies any polyuria or polydipsia.


                               Active Medications











Generic Name Dose Route Start Last Admin





  Trade Name Freq  PRN Reason Stop Dose Admin


 


Al Hydroxide/Mg Hydroxide  30 ml  07/02/20 10:03 





  Maalox  PO  





  Q4HR PRN  





  Heartburn  


 


Atorvastatin Calcium  80 mg  07/02/20 21:00  07/03/20 21:28





  Lipitor  PO   80 mg





  HS KILO   Administration


 


Atropine Sulfate  0.5 mg  07/02/20 10:03 





  Atropine  IV  





  ONCE PRN  





  Symptomatic Bradycardia  


 


Citalopram Hydrobromide  20 mg  07/03/20 09:00  07/03/20 08:18





  Celexa  PO   20 mg





  DAILY KILO   Administration


 


Clopidogrel Bisulfate  75 mg  07/03/20 09:00  07/03/20 08:18





  Plavix  PO   75 mg





  DAILY KILO   Administration


 


Famotidine  20 mg  07/04/20 21:00 





  Pepcid  IV  





  Q12HR KILO  


 


Furosemide  40 mg  07/04/20 09:30 





  Lasix  IV  





  Q8HR KILO  


 


Heparin Sodium (Porcine)  5,000 unit  07/03/20 21:00  07/03/20 21:34





  Heparin  SQ   5,000 unit





  Q12HR KILO   Administration


 


Levofloxacin 500 mg/ IV  100 mls @ 100 mls/hr  07/03/20 20:00  07/03/20 21:55





  Solution  IVPB   100 mls/hr





  DAILY@2000 KILO   Administration


 


Magnesium Oxide  800 mg  07/03/20 09:00  07/03/20 08:18





  Mag-Ox  PO   800 mg





  DAILY KILO   Administration


 


Memantine  10 mg  07/03/20 09:00  07/03/20 21:28





  Namenda  PO   10 mg





  BID KILO   Administration


 


Metoprolol Tartrate  12.5 mg  07/02/20 21:00  07/03/20 21:28





  Lopressor  PO   12.5 mg





  BID KILO   Administration


 


Miscellaneous Information  1 each  07/02/20 10:03 





  Rx Info: Iv Contrast Was Given  MISCELLANE  07/04/20 10:03 





  DAILY PRN  





  Per Protocol  


 


Naloxone HCl  0.2 mg  07/02/20 08:09 





  Narcan  IV  





  Q2M PRN  





  Opioid Reversal  


 


Nitroglycerin  0.4 mg  07/02/20 10:03 





  Nitrostat  SUBLINGUAL  





  Q5M PRN  





  Chest Pain  


 


Tamsulosin HCl  0.4 mg  07/03/20 09:00  07/03/20 08:19





  Flomax  PO   0.4 mg





  DAILY KILO   Administration


 


Trazodone HCl  150 mg  07/02/20 11:24 





  Desyrel  PO  





  HS PRN  





  Insomnia  


 


Zolpidem Tartrate  5 mg  07/02/20 10:03 





  Ambien  PO  





  HS PRN  





  Insomnia  














Objective





- Vital Signs


Vital signs: 


                                   Vital Signs











Temp  97.1 F L  07/04/20 04:00


 


Pulse  81   07/04/20 07:00


 


Resp  21   07/04/20 07:00


 


BP  92/62   07/04/20 07:00


 


Pulse Ox  89 L  07/04/20 07:00








                                 Intake & Output











 07/03/20 07/04/20 07/04/20





 18:59 06:59 18:59


 


Intake Total  600 


 


Output Total  150 


 


Balance  450 


 


Weight  77.2 kg 


 


Intake:   


 


  IV  100 


 


    Levofloxacin 500Mg-D5w  100 





    Pmx 500 mg In Dextrose/   





    Water 1 100ml.bag @ 100   





    mls/hr IVPB DAILY@2000   





    [facility] Rx#:[id number]   


 


  Oral  500 


 


Output:   


 


  Urine  150 


 


Other:   


 


  Voiding Method  Bedside Commode 


 


  # Voids 1 0 1


 


  # Bowel Movements 1 1 1














- Exam





GENERAL: The patient is alert and oriented x3, not in any acute distress. Well 

developed, well nourished. 


HEENT: Pupils are round and equally reacting to light. EOMI. No scleral icterus.

No conjunctival pallor. Normocephalic, atraumatic. No pharyngeal erythema. No 

thyromegaly. 


CARDIOVASCULAR: S1 and S2 present. No murmurs, rubs, or gallops. 


-PULMONARY: Chest is clear to auscultation, bilateral basal crepitation


ABDOMEN: Soft, nontender, nondistended, normoactive bowel sounds. No palpable 

organomegaly. 


MUSCULOSKELETAL: No joint swelling or deformity. 


EXTREMITIES: No cyanosis, clubbing, or pedal edema. 


NEUROLOGICAL: Gross neurological examination did not reveal any focal deficits. 


SKIN: No rashes. no petechiae.





- Labs


CBC & Chem 7: 


                                 07/04/20 05:02





                                 07/04/20 05:02


Labs: 


                  Abnormal Lab Results - Last 24 Hours (Table)











  07/03/20 07/03/20 07/03/20 Range/Units





  19:45 19:45 19:45 


 


WBC  19.6 H    (3.8-10.6)  k/uL


 


RBC  4.15 L    (4.30-5.90)  m/uL


 


MCV  100.6 H    (80.0-100.0)  fL


 


Neutrophils #  16.5 H    (1.3-7.7)  k/uL


 


Monocytes #  1.5 H    (0-1.0)  k/uL


 


Sodium   131 L   (137-145)  mmol/L


 


BUN   37 H   (9-20)  mg/dL


 


Creatinine   1.48 H   (0.66-1.25)  mg/dL


 


Glucose   155 H   (74-99)  mg/dL


 


Procalcitonin    0.23 H  (0.02-0.09)  ng/mL














  07/04/20 07/04/20 Range/Units





  05:02 05:02 


 


WBC  18.1 H   (3.8-10.6)  k/uL


 


RBC  3.85 L   (4.30-5.90)  m/uL


 


MCV  101.7 H   (80.0-100.0)  fL


 


Neutrophils #  15.5 H   (1.3-7.7)  k/uL


 


Monocytes #  1.1 H   (0-1.0)  k/uL


 


Sodium   132 L  (137-145)  mmol/L


 


BUN   43 H  (9-20)  mg/dL


 


Creatinine   1.53 H  (0.66-1.25)  mg/dL


 


Glucose   139 H  (74-99)  mg/dL


 


Procalcitonin    (0.02-0.09)  ng/mL














Assessment and Plan


Assessment: 





STEMI status post cardiac cath and stent placement in the RCA


Acute hypoxic respiratory failure suspicious for pulmonary edema versus p

neumonia


Acute kidney injury


Fall, with no dizziness or syncope


Possible right lower lobe pneumonia, versus atypical pneumonia


Hypertension


Hyperlipidemia


History of prostate cancer status post radiotherapy














Plan: 





This is a pleasant 79 years old male who presents with STEMI, complicated by 

respiratory failure and a KI.  Continue with aspirin and Plavix, cardiologist lakesha lopezing the case closely.  Continue with diuretics.  Consult nephrologist and 

pulmonologist.


Monitor creatinine and electrolytes.  Start the patient on Levaquin and follow-

up protocol and electrolytes level.


We will do CAT scan of the head and neuro check


Labs and medication were reviewed..  Continue same treatment.  Continue with 

symptomatic treatment.  Resume home medication.  Monitor lytes and vitals.  DVT 

and GI prophylaxis.  Further recommendations of the clinical course of the 

patient


DVT prophylaxis: Subcutaneous heparin


GI Prophylaxis: Pepcid


PT/OT: Pending


Prognosis is guarded

## 2020-07-04 NOTE — P.NPCON
History of Present Illness





- Reason for Consult


acute renal failure





- Chief Complaint


Acute kidney injury post cardiac cath





- History of Present Illness





This is a 79-year-old who is admitted with chest pain and had a acute MI and 

underwent a right coronary artery stenting on 07/02/2022 days ago.  Preprocedure

his creatinine was 0.8 and post procedures gone up to 1.4 and 1.5.  Urine output

is documented at 150 mL for the last 24 hours





Blood pressure is somewhat low in the 90s to 103 mm systolic.





Patient has had 3-4 days of severe cough with yellow expectoration prior to his 

admission but denies any fever.  His chest x-ray is suggestive pneumonia with 

additional congestive heart failure possibly.





He is been treated with Lasix and his not responding very well.





He is awake alert and complains of shoulder pains bilaterally but no chest pain.

 He is mildly short of breath.  Says he is dizzy when he sits up.





Denies any dysuria frequency.  





Past Medical History


Past Medical History: Cancer, Dementia, Hearing Disorder / Deafness, 

Hyperlipidemia, Hypertension, Prostate Disorder


Additional Past Medical History / Comment(s): Prostate cancer with radiation 

treatments, benign colon polyps, bilateral hearing aides.


History of Any Multi-Drug Resistant Organisms: None Reported


Past Surgical History: Heart Catheterization With Stent


Additional Past Surgical History / Comment(s): Cystoscopy, colonoscopy with 

benign polypectomy, EGD as a child for foreign object, R elbow fracture with 

surgery, L hand injury with surgery.


Past Anesthesia/Blood Transfusion Reactions: No Reported Reaction


Date of Last Stent Placement:: 7/2/20


Smoking Status: Former smoker





- Past Family History


  ** Father


Additional Family Medical History / Comment(s): Father was an alcoholic.





  ** Mother


Additional Family Medical History / Comment(s): Mother was an alcoholic





Medications and Allergies


                                Home Medications











 Medication  Instructions  Recorded  Confirmed  Type


 


Atorvastatin Calcium [Lipitor] 20 mg PO HS 07/02/20 07/02/20 History


 


Beet Root 1000mg 2,000 mg PO DAILY 07/02/20 07/02/20 History


 


Cholecalciferol [Vitamin D3 (25 5,000 unit PO DAILY 07/02/20 07/02/20 History





Mcg = 1000 Iu)]    


 


Citalopram Hydrobromide [CeleXA] 20 mg PO DAILY 07/02/20 07/02/20 History


 


Donepezil [Aricept] 10 mg PO DAILY 07/02/20 07/02/20 History


 


Magnesium Oxide [Mag-Ox] 800 mg PO DAILY 07/02/20 07/02/20 History


 


Memantine HCl [Memantine HCl ER] 28 mg PO DAILY 07/02/20 07/02/20 History


 


Tamsulosin HCl [Flomax] 0.4 mg PO DAILY 07/02/20 07/02/20 History


 


Vitamin B Complex 1 tab PO DAILY 07/02/20 07/02/20 History


 


amLODIPine [Norvasc] 5 mg PO DAILY 07/02/20 07/02/20 History


 


traZODone  mg PO HS 07/02/20 07/02/20 History








                                    Allergies











Allergy/AdvReac Type Severity Reaction Status Date / Time


 


Penicillins Allergy  Rash/Hives Verified 07/02/20 11:16














Physical Exam


Vitals: 


                                   Vital Signs











  Temp Pulse Resp BP Pulse Ox


 


 07/04/20 11:00   80  22  102/90  90 L


 


 07/04/20 10:00    26 H  103/66 


 


 07/04/20 09:00   78  32 H   92 L


 


 07/04/20 08:00   73  19  92/62  90 L


 


 07/04/20 07:00   81  21  92/62  89 L


 


 07/04/20 06:00   78  26 H  95/65  91 L


 


 07/04/20 05:00   73  23  93/60  95


 


 07/04/20 04:00  97.1 F L  82  22  88/57  91 L


 


 07/04/20 03:00   75  23  


 


 07/04/20 02:00   78  14  96/59 


 


 07/04/20 01:00  98.2 F  77  26 H  88/61  93 L


 


 07/04/20 00:00   75  24  91/62  88 L


 


 07/03/20 23:00   76  18   84 L


 


 07/03/20 22:00   78  14  89/64  91 L


 


 07/03/20 21:00   79  28 H  105/64  90 L


 


 07/03/20 20:00  99.3 F  84  34 H  99/69  90 L


 


 07/03/20 19:58      91 L


 


 07/03/20 19:28   81  16  98/63  89 L


 


 07/03/20 19:00   88  35 H  98/78  92 L


 


 07/03/20 18:30   84  17  98/65  91 L


 


 07/03/20 18:00   80  34 H  100/69  90 L


 


 07/03/20 17:30   80  32 H  94/68  90 L


 


 07/03/20 17:00   85  25 H  119/74  90 L


 


 07/03/20 16:30   99  24  99/65  91 L


 


 07/03/20 16:00   79  18  109/67  92 L


 


 07/03/20 15:30   79  27 H  105/67  90 L


 


 07/03/20 15:00   81  28 H  101/67  93 L


 


 07/03/20 14:30   82  26 H  105/66  93 L


 


 07/03/20 14:00   79  27 H  114/68  92 L


 


 07/03/20 13:30   87  25 H  102/63  90 L


 


 07/03/20 13:00   81  30 H  94/64  90 L


 


 07/03/20 12:00  98.3 F  74  25 H  98/66  91 L








                                Intake and Output











 07/03/20 07/04/20 07/04/20





 22:59 06:59 14:59


 


Intake Total 600  


 


Output Total  150 0


 


Balance 600 -150 0


 


Intake:   


 


    


 


    Levofloxacin 500Mg-D5w 100  





    Pmx 500 mg In Dextrose/   





    Water 1 100ml.bag @ 100   





    mls/hr IVPB DAILY@2000   





    [facility] Rx#:[id number]   


 


  Oral 500  


 


Output:   


 


  Urine  150 0


 


Other:   


 


  Voiding Method Bedside Commode Bedside Commode 


 


  # Voids 0 0 1


 


  # Bowel Movements 1  1


 


  Weight  77.2 kg 








On examination is awake alert oriented but somewhat of a poor historian





HEENT exam no JVP neck is supple no facial asymmetry





Lungs exam is significant with crackles on the right side.  Left side is clear 

good air entry bilaterally





Heart sounds are unremarkable for any murmur rub gallop





Abdomen soft nontender scaphoid nondistended





Extremity exam was no edema





Neurologically awake alert oriented.  Is somewhat difficult to get history from 

though.





He is warm to touch the





Results





- Lab Results


                             Most recent lab results











Calcium  8.6 mg/dL (8.4-10.2)   07/04/20  05:02    














                                 07/04/20 05:02





                                 07/04/20 05:02





Assessment and Plan


Assessment: 





Impression





1.  Acute kidney injury from combination of low blood pressure, heart attack and

 cardiac catheterization dated 07/02/2022 days ago





2.  Hypotension.





3.  Possible pneumonia and congestive heart failure.





4.  Covid- 19 negative.





5.  Hyponatremia  from acute kidney injury - sodium is 132





Commendations





1.  Discussed with Dr. Cintron,





2.  Would diuresis cautiously as his blood pressure is low.





3.  Watch his respiratory status closely and if necessary we can escalate his 

Lasix dose if it gets worse.





4.  Strict I's and O's





Thank you for this consultation and we'll continue to follow

## 2020-07-04 NOTE — P.PN
Subjective





This is a pleasant 79 years old male with past medical history of hypertension, 

hyperlipidemia, dementia, prostate cancer status post radiotherapy , patient 

presents because of chest pain found to have STEMI, he underwent cardiac cath 

status post stent placement in the right coronary artery.  He is in the ICU.


  His creatinine increased today to 1.4, WBC is high at 18.6 and 19.6 K, sodium 

131, his chest x-ray showing pulmonary congestion with possible interstitial 

pneumonia


He saturating 90s on 7 L/m oxygen via nasal cannula.


Cardiologist already given 1 dose of Lasix IV and continued on 40 mg by mouth 

daily, 1 going to give him another dose of Lasix.  Blood pressure is on the low 

normal but he is tachypneic at 35.  A but that side nurse his breathing is 

little better in the evening than in the morning and he did not need BiPAP for 

now.


He is also on aspirin and Plavix, he is on metoprolol 12.5 mg and lisinopril 2.5

mg, we going to hold lisinopril for now


Also call pulmonary and nephrology consult











Review of systems


CONSTITUTIONAL: No fever, no malaise, no fatigue. 


HEENT: No recent visual problems or hearing problems. Denied any sore throat. 


CARDIOVASCULAR: no palpitations, no syncope. 


PULMONARY: no hemoptysis. 


GASTROINTESTINAL: No diarrhea, no nausea, no vomiting, no abdominal pain. 

Normoactive bowel sounds. 


NEUROLOGICAL: No headaches, no weakness, no numbness. 


HEMATOLOGICAL: Denies any bleeding or petechiae. 


GENITOURINARY: Denies any burning micturition, frequency, or urgency. 


MUSCULOSKELETAL/RHEUMATOLOGICAL: Denies any joint pain, swelling, or any muscle 

pain. 


ENDOCRINE: Denies any polyuria or polydipsia.





Medication including aspirin, Plavix, lisinopril, metoprolol, Levaquin, Lipitor,

Celexa, subcutaneous heparin, magnesium, memory 18, nitroglycerin, tamsulosin, 

trazodone, Ambien.  Doses is reviewed





Objective





- Vital Signs


Vital signs: 


                                   Vital Signs











Temp  98.3 F   07/03/20 12:00


 


Pulse  80   07/03/20 18:00


 


Resp  34 H  07/03/20 18:00


 


BP  100/69   07/03/20 18:00


 


Pulse Ox  90 L  07/03/20 18:00








                                 Intake & Output











 07/02/20 07/03/20 07/03/20





 18:59 06:59 18:59


 


Intake Total 1032 440 


 


Output Total 200 500 


 


Balance 832 -60 


 


Weight 71.214 kg 76.4 kg 


 


Intake:   


 


    


 


    Sheaths 12  


 


    Sodium Chloride 0.9% 1, 455  





    000 ml @ 75 mls/hr IV .   





    [id number] [facility] Rx#:[id number]   


 


  Oral 150 440 


 


Output:   


 


  Urine 200 500 


 


Other:   


 


  Voiding Method Urinal Bedside Commode 


 


  # Voids 1 2 1


 


  # Bowel Movements  2 1














- Exam





GENERAL: The patient is alert and oriented x3, not in any acute distress. Well 

developed, well nourished. 


HEENT: Pupils are round and equally reacting to light. EOMI. No scleral icterus.

No conjunctival pallor. Normocephalic, atraumatic. No pharyngeal erythema. No 

thyromegaly. 


CARDIOVASCULAR: S1 and S2 present. No murmurs, rubs, or gallops. 


-PULMONARY: Chest is clear to auscultation, bilateral basal crepitation


ABDOMEN: Soft, nontender, nondistended, normoactive bowel sounds. No palpable 

organomegaly. 


MUSCULOSKELETAL: No joint swelling or deformity. 


EXTREMITIES: No cyanosis, clubbing, or pedal edema. 


NEUROLOGICAL: Gross neurological examination did not reveal any focal deficits. 


SKIN: No rashes. no petechiae.





- Labs


CBC & Chem 7: 


                                 07/04/20 05:02





                                 07/04/20 05:02


Labs: 


                  Abnormal Lab Results - Last 24 Hours (Table)











  07/03/20 07/03/20 Range/Units





  04:46 04:46 


 


WBC   18.6 H  (3.8-10.6)  k/uL


 


RBC   4.18 L  (4.30-5.90)  m/uL


 


MCV   103.2 H  (80.0-100.0)  fL


 


Sodium  134 L   (137-145)  mmol/L


 


BUN  27 H   (9-20)  mg/dL


 


Creatinine  1.49 H   (0.66-1.25)  mg/dL


 


Glucose  188 H   (74-99)  mg/dL














Assessment and Plan


Assessment: 





STEMI status post cardiac cath and stent placement in the RCA


Acute hypoxic respiratory failure suspicious for pulmonary edema versus 

pneumonia


Acute kidney injury


Hypertension


Hyperlipidemia


History of prostate cancer status post radiotherapy














Plan: 





This is a pleasant 79 years old male who presents with STEMI, complicated by 

respiratory failure and a KI.  Continue with aspirin and Plavix, cardiologist 

following the case closely.  Continue with diuretics.  Consult nephrologist and 

pulmonologist.


Monitor creatinine and electrolytes.  Start the patient on Levaquin and [facility]


Labs and medication were reviewed..  Continue same treatment.  Continue with 

symptomatic treatment.  Resume home medication.  Monitor lytes and vitals.  DVT 

and GI prophylaxis.  Further recommendations of the clinical course of the 

patient


DVT prophylaxis: Subcutaneous heparin


GI Prophylaxis: Pepcid


PT/OT: Pending


Prognosis is guarded

## 2020-07-04 NOTE — CT
EXAMINATION TYPE: CT brain wo con

 

DATE OF EXAM: 7/4/2020

 

COMPARISON:

 

INDICATION: Fall

 

DLP: 1099.4 mGycm, Automated exposure control for dose reduction was used.

 

CONTRAST: None

 

CT of the brain is performed utilizing 3 mm thick sections through the posterior fossa and 3 mm thick
 sections through the remaining calvarium.  Study is performed within 24 hours of arrival to the hosp
ital. 

 

No abnormal hyperdensity is present to suggest an acute intracranial hemorrhage.

No mass lesion is evident.

No acute infarcts are evident. There is chronic appearing periventricular white matter changes most l
ikely on the basis of chronic white matter ischemic change.

Ventricles and sulci are slightly prominent for the patient age.  

Paranasal sinuses and mastoid air cells within the field-of-view are clear.

 

IMPRESSIONS:

1.   Chronic appearing periventricular white matter ischemic changes with mild atrophy

## 2020-07-04 NOTE — ECHOF
Referral Reason:repeat check for mitral leakage



MEASUREMENTS

--------

HEIGHT: 170.2 cm

WEIGHT: 77.1 kg

BP: 







FINDINGS

--------

Sinus rhythm.

Echo Done 7/3/20: Limited study for mr.

Mild mitral regurgitation is present.

There is no pericardial effusion.



CONCLUSIONS

--------

1. Sinus rhythm.

2. Echo Done 7/3/20: Limited study for mr.

3. Mild mitral regurgitation is present.

4. There is no pericardial effusion.





SONOGRAPHER: Angelina Booth RDCS

## 2020-07-04 NOTE — XR
EXAMINATION TYPE: XR chest 1V

 

DATE OF EXAM: 7/4/2020

 

COMPARISON: 7/3/2020

 

INDICATION: Previous abnormal

 

TECHNIQUE: Single frontal view of the chest is obtained. Position is semiupright

 

FINDINGS:  

The heart size is normal.  

The pulmonary vasculature is increased.  

There is diffuse increased lung markings. This is more focal in the right lower lobe. Small right ple
ural effusion may be present.  

 

 

IMPRESSION:  

1. Clinical correlation recommended for pulmonary edema. Atypical pneumonia should be considered. Rig
ht lower lobe pneumonia may be present.

## 2020-07-05 LAB
ANION GAP SERPL CALC-SCNC: 11 MMOL/L
BASOPHILS # BLD AUTO: 0 K/UL (ref 0–0.2)
BASOPHILS NFR BLD AUTO: 0 %
BUN SERPL-SCNC: 65 MG/DL (ref 9–20)
CALCIUM SPEC-MCNC: 8.6 MG/DL (ref 8.4–10.2)
CHLORIDE SERPL-SCNC: 96 MMOL/L (ref 98–107)
CO2 SERPL-SCNC: 21 MMOL/L (ref 22–30)
EOSINOPHIL # BLD AUTO: 0.1 K/UL (ref 0–0.7)
EOSINOPHIL NFR BLD AUTO: 1 %
ERYTHROCYTE [DISTWIDTH] IN BLOOD BY AUTOMATED COUNT: 3.58 M/UL (ref 4.3–5.9)
ERYTHROCYTE [DISTWIDTH] IN BLOOD: 13.2 % (ref 11.5–15.5)
GLUCOSE SERPL-MCNC: 125 MG/DL (ref 74–99)
HCT VFR BLD AUTO: 36 % (ref 39–53)
HGB BLD-MCNC: 12.2 GM/DL (ref 13–17.5)
LYMPHOCYTES # SPEC AUTO: 1.1 K/UL (ref 1–4.8)
LYMPHOCYTES NFR SPEC AUTO: 7 %
MCH RBC QN AUTO: 34.2 PG (ref 25–35)
MCHC RBC AUTO-ENTMCNC: 34 G/DL (ref 31–37)
MCV RBC AUTO: 100.6 FL (ref 80–100)
MONOCYTES # BLD AUTO: 1.2 K/UL (ref 0–1)
MONOCYTES NFR BLD AUTO: 7 %
NEUTROPHILS # BLD AUTO: 14.4 K/UL (ref 1.3–7.7)
NEUTROPHILS NFR BLD AUTO: 84 %
PLATELET # BLD AUTO: 278 K/UL (ref 150–450)
POTASSIUM SERPL-SCNC: 4.4 MMOL/L (ref 3.5–5.1)
SODIUM SERPL-SCNC: 128 MMOL/L (ref 137–145)
WBC # BLD AUTO: 17.1 K/UL (ref 3.8–10.6)

## 2020-07-05 RX ADMIN — Medication SCH MG: at 21:59

## 2020-07-05 RX ADMIN — CLOPIDOGREL BISULFATE SCH MG: 75 TABLET ORAL at 08:55

## 2020-07-05 RX ADMIN — Medication SCH MG: at 13:18

## 2020-07-05 RX ADMIN — Medication SCH MG: at 08:55

## 2020-07-05 RX ADMIN — CITALOPRAM HYDROBROMIDE SCH MG: 20 TABLET ORAL at 08:54

## 2020-07-05 RX ADMIN — DILTIAZEM HYDROCHLORIDE SCH MLS/HR: 5 INJECTION INTRAVENOUS at 19:03

## 2020-07-05 RX ADMIN — Medication SCH MG: at 17:07

## 2020-07-05 RX ADMIN — MEMANTINE HYDROCHLORIDE SCH MG: 10 TABLET ORAL at 08:56

## 2020-07-05 RX ADMIN — METOPROLOL TARTRATE SCH MG: 25 TABLET, FILM COATED ORAL at 08:57

## 2020-07-05 RX ADMIN — DOBUTAMINE HYDROCHLORIDE SCH MLS/HR: 200 INJECTION INTRAVENOUS at 11:00

## 2020-07-05 RX ADMIN — ATORVASTATIN CALCIUM SCH MG: 80 TABLET, FILM COATED ORAL at 21:59

## 2020-07-05 RX ADMIN — LEVOFLOXACIN SCH MG: 250 TABLET, FILM COATED ORAL at 21:59

## 2020-07-05 RX ADMIN — HEPARIN SODIUM SCH UNIT: 5000 INJECTION, SOLUTION INTRAVENOUS; SUBCUTANEOUS at 08:54

## 2020-07-05 RX ADMIN — FAMOTIDINE SCH MG: 20 TABLET, FILM COATED ORAL at 08:54

## 2020-07-05 RX ADMIN — METOPROLOL TARTRATE SCH MG: 25 TABLET, FILM COATED ORAL at 21:58

## 2020-07-05 RX ADMIN — HEPARIN SODIUM SCH UNIT: 5000 INJECTION, SOLUTION INTRAVENOUS; SUBCUTANEOUS at 21:59

## 2020-07-05 RX ADMIN — MEMANTINE HYDROCHLORIDE SCH MG: 10 TABLET ORAL at 21:59

## 2020-07-05 RX ADMIN — TAMSULOSIN HYDROCHLORIDE SCH MG: 0.4 CAPSULE ORAL at 08:55

## 2020-07-05 NOTE — P.PN
Subjective


Progress Note Date: 07/05/20


Principal diagnosis: 





This is 79-year-old male seen in consultation because of acute kidney injury 

from combination off prerenal from acute MI and dye toxicity.  Additionally he 

might have pneumonia.





He was admitted with chest pain and had an acute MI.  Underwent cardiac catheter

ization.  His creatinine went up since the procedure and his urine output has 

gone down.





His blood pressure is in the 80s to 90s echocardiogram shows no valvular 

dysfunction except for mild mitral regurgitation, ejection fraction 45-50%.





He does have small amount of frequent stools.  Urine output is not well 

documented but is slow.  Creatinine continues to go up.  No evidence of 

cholesterol embolization.  He was given Lasix with no significant response.  A 

chest x-ray shows bilateral disease with possible pneumonia and additional 

congestive heart failure.  He is carb with antibiotics.  He did have is high 

lactic acid at 3.9 and 3.8.  White count was 19,600.  But he did not have any 

fever.  He did have cough for 3-4 days with yellow expectoration prior to his 

admission.  





Objective





- Vital Signs


Vital signs: 


                                   Vital Signs











Temp  98.0 F   07/05/20 08:00


 


Pulse  78   07/05/20 08:00


 


Resp  29 H  07/05/20 08:00


 


BP  86/62   07/05/20 08:00


 


Pulse Ox  91 L  07/05/20 08:00








                                 Intake & Output











 07/04/20 07/05/20 07/05/20





 18:59 06:59 18:59


 


Intake Total  750 


 


Output Total 0 125 75


 


Balance 0 625 -75


 


Weight  79.2 kg 


 


Intake:   


 


  Oral  750 


 


Output:   


 


  Urine 0 125 75


 


Other:   


 


  Voiding Method Bedside Commode Bedside Commode Bedside Commode


 


  # Voids 1 0 1


 


  # Bowel Movements 1  2








Currently on exam awake alert oriented at looks pale





HEENT exam JVP is elevated 7-8 cm about sternal angle





Neck is supple no facial asymmetry





Lungs are significant for bilateral occasional crackles but more on the right 

than on the left





Good air entry bilaterally





Heart sounds are unremarkable for any murmur rub gallop





Abdomen soft nontender nondistended





Extremity exam reveals no edema





Neurologically awake alert oriented 





- Labs


CBC & Chem 7: 


                                 07/05/20 04:54





                                 07/05/20 04:54


Labs: 


                  Abnormal Lab Results - Last 24 Hours (Table)











  07/04/20 07/04/20 07/05/20 Range/Units





  15:37 18:26 04:54 


 


WBC    17.1 H  (3.8-10.6)  k/uL


 


RBC    3.58 L  (4.30-5.90)  m/uL


 


Hgb    12.2 L  (13.0-17.5)  gm/dL


 


Hct    36.0 L  (39.0-53.0)  %


 


MCV    100.6 H  (80.0-100.0)  fL


 


Neutrophils #    14.4 H  (1.3-7.7)  k/uL


 


Monocytes #    1.2 H  (0-1.0)  k/uL


 


Sodium     (137-145)  mmol/L


 


Chloride     ()  mmol/L


 


Carbon Dioxide     (22-30)  mmol/L


 


BUN     (9-20)  mg/dL


 


Creatinine     (0.66-1.25)  mg/dL


 


Glucose     (74-99)  mg/dL


 


Plasma Lactic Acid Francis  3.9 H*  3.8 H*   (0.7-2.0)  mmol/L














  07/05/20 Range/Units





  04:54 


 


WBC   (3.8-10.6)  k/uL


 


RBC   (4.30-5.90)  m/uL


 


Hgb   (13.0-17.5)  gm/dL


 


Hct   (39.0-53.0)  %


 


MCV   (80.0-100.0)  fL


 


Neutrophils #   (1.3-7.7)  k/uL


 


Monocytes #   (0-1.0)  k/uL


 


Sodium  128 L  (137-145)  mmol/L


 


Chloride  96 L  ()  mmol/L


 


Carbon Dioxide  21 L  (22-30)  mmol/L


 


BUN  65 H  (9-20)  mg/dL


 


Creatinine  2.31 H  (0.66-1.25)  mg/dL


 


Glucose  125 H  (74-99)  mg/dL


 


Plasma Lactic Acid Francis   (0.7-2.0)  mmol/L








                      Microbiology - Last 24 Hours (Table)











 07/04/20 18:40 Gram Stain - Preliminary





 Sputum Sputum Culture - Preliminary














Assessment and Plan


Assessment: 





Impression





1.  Acute kidney injury from combination of low blood pressure, heart attack and

cardiac catheterization dated 07/02/2022 as well as pneumonia and possible 

congestive heart failure.  Creatinine gone up to 2.31 from 1.53 yesterday.  He 

is in ATN and therefore will not respond to Lasix





2.  Hypotension.  Echocardiogram done on 07/03/2020 shows 50% ejection fraction 

and mild mitral regurgitation





3.  Possible pneumonia and congestive heart failure.





4.  Covid- 19 negative.





5.  Hyponatremia  from acute kidney injury - sodium is 132, worsened to 128 this

morning from acute kidney injury.





6.  Mild degree of non-gap acidosis.  Bicarb is 21 down from 24.  Etiology is 

acute kidney injury and loose stools





Commendations





1.  Consider using inotropes to improve his blood pressure and mean arterial 

pressure.





2.  Would diuresis cautiously as his blood pressure is low.





3.  Watch his respiratory status closely and if necessary we can escalate his 

Lasix dose if it gets worse.





4.  Strict I's and O's





5.  He may need dialysis the next 24-48 hours.





6.  Add sodium bicarb 650 3 times a day 





Thank you for this consultation and we'll continue to follow

## 2020-07-05 NOTE — P.PN
Subjective





This is a pleasant 79 years old male with past medical history of hypertension, 

hyperlipidemia, dementia, prostate cancer status post radiotherapy , patient 

presents because of chest pain found to have STEMI, he underwent cardiac cath 

status post stent placement in the right coronary artery.  He is in the ICU.


  His creatinine increased today to 1.4, WBC is high at 18.6 and 19.6 K, sodium 

131, his chest x-ray showing pulmonary congestion with possible interstitial 

pneumonia


He saturating 90s on 7 L/m oxygen via nasal cannula.


Cardiologist already given 1 dose of Lasix IV and continued on 40 mg by mouth 

daily, 1 going to give him another dose of Lasix.  Blood pressure is on the low 

normal but he is tachypneic at 35.  A but that side nurse his breathing is 

little better in the evening than in the morning and he did not need BiPAP for 

now.


He is also on aspirin and Plavix, he is on metoprolol 12.5 mg and lisinopril 2.5

mg, we going to hold lisinopril for now


Also call pulmonary and nephrology consult





07/04/2020


Patient breathing easier but however his significantly tachypneic and mid 20s, 

he saturating 94% on 9 L oxygen via nasal cannula.  His blood pressure 96/57 and

heart rate 77.


He still bleeding from dyspnea but no chest pain, he has little cough with clear

phlegm.


This morning he was trying to get out of bed to the restroom by himself when he 

fell on hit his head in the forehead area.


Chest x-ray: Pulmonary edema, atypical pneumonia, some right lower lobe 

pneumonia


WBC this morning is 18.1 K with slight improvement.  Sodium 132, creatinine 

stable at 1.5.  Procol stone and is elevated at 0.23


Patient is currently on by mouth Lasix, lisinopril is held, cardiologist 

recommended to continue with metoprolol 12.5, we going to check with cardiology 

about holding parameters.  Continue with Levaquin.


Ordered EKG








7/5/20


Patient awake and alert, his breathing is easier although he still complains 

from some chest pain with little dyspnea and the 10 cough.


He is saturating 94% on 3-5 L of oxygen via nasal cannula.  Rest of Vitas looks 

stable, however her blood pressure is on the low side, this morning was 78/56-

94/59 


WBC is 17.1 K, hemoglobin 12.2, sodium 128, creatinine went up to 2.3.  Lactic 

acid is elevated at 3.8.  ProBNP is 41 500 


sputum culture is pending.  Chest x-ray showing stable changes with diffuse 

parenchymal changes or diffuse pneumonia or CHF/pulmonary edema


Cardiology started the patient on dobutamine drip, also he got 1 dose of Lasix 

60 mg IV 1 and on sodium bicarbonate tablets.


Patient also is on Levaquin 250 mg by mouth daily, which is renal dose.  

Pulmonary nephrology and cardiology teams on the case








Review of systems


CONSTITUTIONAL: No fever, no malaise, no fatigue. 


HEENT: No recent visual problems or hearing problems. Denied any sore throat. 


CARDIOVASCULAR: no palpitations, no syncope. 


PULMONARY: no hemoptysis. 


GASTROINTESTINAL: No diarrhea, no nausea, no vomiting, no abdominal pain. 

Normoactive bowel sounds. 


NEUROLOGICAL: No headaches, no weakness, no numbness. 


HEMATOLOGICAL: Denies any bleeding or petechiae. 


GENITOURINARY: Denies any burning micturition, frequency, or urgency. 


MUSCULOSKELETAL/RHEUMATOLOGICAL: Denies any joint pain, swelling, or any muscle 

pain. 


ENDOCRINE: Denies any polyuria or polydipsia.


                               Active Medications











Generic Name Dose Route Start Last Admin





  Trade Name Freq  PRN Reason Stop Dose Admin


 


Al Hydroxide/Mg Hydroxide  30 ml  07/02/20 10:03 





  Maalox  PO  





  Q4HR PRN  





  Heartburn  


 


Atorvastatin Calcium  80 mg  07/02/20 21:00  07/04/20 20:25





  Lipitor  PO   80 mg





  HS KILO   Administration


 


Atropine Sulfate  0.5 mg  07/02/20 10:03 





  Atropine  IV  





  ONCE PRN  





  Symptomatic Bradycardia  


 


Citalopram Hydrobromide  20 mg  07/03/20 09:00  07/05/20 08:54





  Celexa  PO   20 mg





  DAILY KILO   Administration


 


Clopidogrel Bisulfate  75 mg  07/03/20 09:00  07/05/20 08:55





  Plavix  PO   75 mg





  DAILY KILO   Administration


 


Famotidine  20 mg  07/04/20 10:00  07/05/20 08:54





  Pepcid  PO   20 mg





  DAILY KILO   Administration


 


Heparin Sodium (Porcine)  5,000 unit  07/03/20 21:00  07/05/20 08:54





  Heparin  SQ   5,000 unit





  Q12HR KILO   Administration


 


Dobutamine HCl/Dextrose 500 mg  250 mls @ 11.88 mls/hr  07/05/20 10:45 





  / IV Solution  IV  





  .Q21H3M KILO  





  5 MCG/KG/MIN  


 


Levofloxacin  250 mg  07/04/20 21:00  07/04/20 20:26





  Levaquin  PO   250 mg





  HS KILO   Administration


 


Magnesium Oxide  800 mg  07/03/20 09:00  07/05/20 08:55





  Mag-Ox  PO   800 mg





  DAILY KILO   Administration


 


Memantine  10 mg  07/03/20 09:00  07/05/20 08:56





  Namenda  PO   10 mg





  BID KILO   Administration


 


Metoprolol Tartrate  12.5 mg  07/02/20 21:00  07/05/20 08:57





  Lopressor  PO   12.5 mg





  BID KILO   Administration


 


Naloxone HCl  0.2 mg  07/02/20 08:09 





  Narcan  IV  





  Q2M PRN  





  Opioid Reversal  


 


Nitroglycerin  0.4 mg  07/02/20 10:03 





  Nitrostat  SUBLINGUAL  





  Q5M PRN  





  Chest Pain  


 


Sodium Bicarbonate  650 mg  07/05/20 11:15 





  Sodium Bicarbonate Tab  PO  





  TID KILO  


 


Tamsulosin HCl  0.4 mg  07/03/20 09:00  07/05/20 08:55





  Flomax  PO   0.4 mg





  DAILY KILO   Administration


 


Trazodone HCl  150 mg  07/02/20 11:24 





  Desyrel  PO  





  HS PRN  





  Insomnia  


 


Zolpidem Tartrate  5 mg  07/02/20 10:03 





  Ambien  PO  





  HS PRN  





  Insomnia  














Objective





- Vital Signs


Vital signs: 


                                   Vital Signs











Temp  98.0 F   07/05/20 08:00


 


Pulse  73   07/05/20 11:00


 


Resp  26 H  07/05/20 11:00


 


BP  78/56   07/05/20 11:00


 


Pulse Ox  94 L  07/05/20 11:00








                                 Intake & Output











 07/04/20 07/05/20 07/05/20





 18:59 06:59 18:59


 


Intake Total  750 150


 


Output Total 0 125 75


 


Balance 0 625 75


 


Weight  79.2 kg 


 


Intake:   


 


  Oral  750 150


 


Output:   


 


  Urine 0 125 75


 


Other:   


 


  Voiding Method Bedside Commode Bedside Commode Bedside Commode


 


  # Voids 1 0 1


 


  # Bowel Movements 1  2














- Exam





GENERAL: The patient is alert and oriented x3, not in any acute distress. Well 

developed, well nourished. 


HEENT: Pupils are round and equally reacting to light. EOMI. No scleral icterus.

No conjunctival pallor. Normocephalic, atraumatic. No pharyngeal erythema. No 

thyromegaly. 


CARDIOVASCULAR: S1 and S2 present. No murmurs, rubs, or gallops. 


-PULMONARY: Chest is clear to auscultation, bilateral basal crepitation


ABDOMEN: Soft, nontender, nondistended, normoactive bowel sounds. No palpable 

organomegaly. 


MUSCULOSKELETAL: No joint swelling or deformity. 


EXTREMITIES: No cyanosis, clubbing, or pedal edema. 


NEUROLOGICAL: Gross neurological examination did not reveal any focal deficits. 


SKIN: No rashes. no petechiae.





- Labs


CBC & Chem 7: 


                                 07/05/20 04:54





                                 07/05/20 04:54


Labs: 


                  Abnormal Lab Results - Last 24 Hours (Table)











  07/04/20 07/04/20 07/05/20 Range/Units





  15:37 18:26 04:54 


 


WBC    17.1 H  (3.8-10.6)  k/uL


 


RBC    3.58 L  (4.30-5.90)  m/uL


 


Hgb    12.2 L  (13.0-17.5)  gm/dL


 


Hct    36.0 L  (39.0-53.0)  %


 


MCV    100.6 H  (80.0-100.0)  fL


 


Neutrophils #    14.4 H  (1.3-7.7)  k/uL


 


Monocytes #    1.2 H  (0-1.0)  k/uL


 


Sodium     (137-145)  mmol/L


 


Chloride     ()  mmol/L


 


Carbon Dioxide     (22-30)  mmol/L


 


BUN     (9-20)  mg/dL


 


Creatinine     (0.66-1.25)  mg/dL


 


Glucose     (74-99)  mg/dL


 


Plasma Lactic Acid Francis  3.9 H*  3.8 H*   (0.7-2.0)  mmol/L














  07/05/20 Range/Units





  04:54 


 


WBC   (3.8-10.6)  k/uL


 


RBC   (4.30-5.90)  m/uL


 


Hgb   (13.0-17.5)  gm/dL


 


Hct   (39.0-53.0)  %


 


MCV   (80.0-100.0)  fL


 


Neutrophils #   (1.3-7.7)  k/uL


 


Monocytes #   (0-1.0)  k/uL


 


Sodium  128 L  (137-145)  mmol/L


 


Chloride  96 L  ()  mmol/L


 


Carbon Dioxide  21 L  (22-30)  mmol/L


 


BUN  65 H  (9-20)  mg/dL


 


Creatinine  2.31 H  (0.66-1.25)  mg/dL


 


Glucose  125 H  (74-99)  mg/dL


 


Plasma Lactic Acid Francis   (0.7-2.0)  mmol/L








                      Microbiology - Last 24 Hours (Table)











 07/04/20 18:40 Gram Stain - Preliminary





 Sputum Sputum Culture - Preliminary














Assessment and Plan


Assessment: 





STEMI status post cardiac cath and stent placement in the RCA


Acute hypoxic respiratory failure suspicious for pulmonary edema versus 

pneumonia


Acute kidney injury


Hypotension


Fall, with no dizziness or syncope


Possible right lower lobe pneumonia, versus atypical pneumonia


Hypertension


Hyperlipidemia


History of prostate cancer status post radiotherapy














Plan: 





This is a pleasant 79 years old male who presents with STEMI, complicated by 

respiratory failure and a KI.  Continue with aspirin and Plavix, cardiologist 

following the case closely.  Continue with diuretics.  Consult nephrologist and 

pulmonologist.


Monitor creatinine and electrolytes.  Start the patient on Levaquin.  Dobutamine

was started by cardiologist team


Labs and medication were reviewed..  Continue same treatment.  Continue with 

symptomatic treatment.  Resume home medication.  Monitor lytes and vitals.  DVT 

and GI prophylaxis.  Further recommendations of the clinical course of the 

patient


DVT prophylaxis: Subcutaneous heparin


GI Prophylaxis: Pepcid


PT/OT: Pending


Prognosis is guarded

## 2020-07-05 NOTE — XR
EXAMINATION TYPE: XR chest 1V portable

 

DATE OF EXAM: 7/5/2020

 

COMPARISON: 17/4/2020

 

HISTORY: Shortness of breath

 

TECHNIQUE: Single frontal view of the chest is obtained.

 

FINDINGS:  Diffuse pleural-parenchymal changes greater on the right with right-sided effusion stable.


Atherosclerotic change aorta. No pneumothorax. Osseous structures stable.

 

IMPRESSION:  

1. Stable diffuse parenchymal changes correlate for diffuse pneumonia, otherwise consider CHF with pu
lmonary edema.

## 2020-07-05 NOTE — P.PN
Subjective


Progress Note Date: 07/05/20


This is a 79-year-old gentleman with history of hypertension who presented with 

inferior wall myocardial infarction.  Patient had a cardiac catheterization and 

stent placement of the mid RCA.  His CPK was high in the troponin was in the 

range of 77 on admission.  His echo Cardigan showed an ejection fraction of 45%.

 Patient is complaining of some congestion and seemed to be mild short of 

breath.  Chest x-ray showed evidence of congestive heart failure.  Heart 

appeared to be regular.  No peripheral edema.  I'm going to start him on IV 

Lasix 40 mg and start him by mouth Lasix.  We'll also give potassium supplement.

 Rest of the medication to be continued.  We'll continue follow his input and 

output.  Follow blood work tomorrow.  Further condition depend upon the clinical

course.  We'll continue to monitor him in intensive care unit








07/04/2020: This patient is admitted to the hospital with inferior wall MI, 

probably subacute.  Patient had stent placement.  His eldest was an ejection 

fraction of 40% to 45% with hypokinesis of the inferior wall.  Patient has been 

short of breath.  A chest x-ray showed some evidence of possible pulmonary edema

and possible atypical pneumonia involving the right lower lobe.  Patient does 

have white count elevation.  Clinically he does have a slow murmur in the apical

area and also left sternal border.  Rule out possibility of mitral 

regurgitation.  I'm going to repeat the echocardiogram.  His urine output is 

fair.  Pulmonary consult is requested regarding possible pneumonia.





07/05/2020: This patient is a status post subacute inferior wall MI and stent 

placement.  Patient is still complaining of shortness of breath and congestion. 

His lungs show scattered rhonchi and mild wheezing.  Chest x-ray shows bilateral

findings suggestive of pulmonary edema more so on the right side.  Atypical 

pneumonia cannot be excluded.  His blood pressure is running low.  His 

creatinine is also going up.  I'm going to start him on dobutamine 5 mics per 

KG.  Patient is also on Lasix.  Continue rest of the medication.  His repeat 

echocardiogram did not reveal any significant valvular abnormalities.  Patient 

does have systolic murmur.  We'll make consider COURTNEY examination ,if  patient 

doesn't improve promptly





Objective





- Vital Signs


Vital signs: 


                                   Vital Signs











Temp  98.0 F   07/05/20 08:00


 


Pulse  78   07/05/20 08:00


 


Resp  29 H  07/05/20 08:00


 


BP  86/62   07/05/20 08:00


 


Pulse Ox  91 L  07/05/20 08:00








                                 Intake & Output











 07/04/20 07/05/20 07/05/20





 18:59 06:59 18:59


 


Intake Total  750 


 


Output Total 0 125 75


 


Balance 0 625 -75


 


Weight  79.2 kg 


 


Intake:   


 


  Oral  750 


 


Output:   


 


  Urine 0 125 75


 


Other:   


 


  Voiding Method Bedside Commode Bedside Commode Bedside Commode


 


  # Voids 1 0 1


 


  # Bowel Movements 1  














- Exam





GENERAL EXAM: Patient is alert and oriented and appears to be in mild 

respiratory distress and cough


HEENT: Normocephalic. Normal reaction of pupils, equal size, normal range of 

extraocular motion. No erythema or exudates in the throat.


NECK: No masses, no nuchal rigidity.


CHEST: No chest wall deformity.


LUNGS: Scattered rhonchi and wheezes


HEART: S1, S2 heard.  Systolic murmur heard at the apex and left sternal border


ABDOMEN: No hepatosplenomegaly, normal bowel sounds, no guarding or rigidity.


SKIN: No rashes


CENTRAL NERVOUS SYSTEM: No focal deficits.


EXTREMITIES: No cyanosis, clubbing or edema.





- Labs


CBC & Chem 7: 


                                 07/05/20 04:54





                                 07/05/20 04:54


Labs: 


                  Abnormal Lab Results - Last 24 Hours (Table)











  07/04/20 07/04/20 07/05/20 Range/Units





  15:37 18:26 04:54 


 


WBC    17.1 H  (3.8-10.6)  k/uL


 


RBC    3.58 L  (4.30-5.90)  m/uL


 


Hgb    12.2 L  (13.0-17.5)  gm/dL


 


Hct    36.0 L  (39.0-53.0)  %


 


MCV    100.6 H  (80.0-100.0)  fL


 


Neutrophils #    14.4 H  (1.3-7.7)  k/uL


 


Monocytes #    1.2 H  (0-1.0)  k/uL


 


Sodium     (137-145)  mmol/L


 


Chloride     ()  mmol/L


 


Carbon Dioxide     (22-30)  mmol/L


 


BUN     (9-20)  mg/dL


 


Creatinine     (0.66-1.25)  mg/dL


 


Glucose     (74-99)  mg/dL


 


Plasma Lactic Acid Francis  3.9 H*  3.8 H*   (0.7-2.0)  mmol/L














  07/05/20 Range/Units





  04:54 


 


WBC   (3.8-10.6)  k/uL


 


RBC   (4.30-5.90)  m/uL


 


Hgb   (13.0-17.5)  gm/dL


 


Hct   (39.0-53.0)  %


 


MCV   (80.0-100.0)  fL


 


Neutrophils #   (1.3-7.7)  k/uL


 


Monocytes #   (0-1.0)  k/uL


 


Sodium  128 L  (137-145)  mmol/L


 


Chloride  96 L  ()  mmol/L


 


Carbon Dioxide  21 L  (22-30)  mmol/L


 


BUN  65 H  (9-20)  mg/dL


 


Creatinine  2.31 H  (0.66-1.25)  mg/dL


 


Glucose  125 H  (74-99)  mg/dL


 


Plasma Lactic Acid Francis   (0.7-2.0)  mmol/L








                      Microbiology - Last 24 Hours (Table)











 07/04/20 18:40 Gram Stain - Preliminary





 Sputum Sputum Culture - Preliminary














Assessment and Plan


(1) Acute transmural inferior wall MI


Current Visit: Yes   Status: Acute   Code(s): I21.19 - STEMI INVOLVING OTH 

CORONARY ARTERY OF INFERIOR WALL   SNOMED Code(s): 13170078


   





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


Current Visit: Yes   Status: Acute   Code(s): I50.41 - ACUTE COMBINED SYSTOLIC 

AND DIASTOLIC (CONGESTIVE) HRT FAIL   SNOMED Code(s): 561879295448425


   





(3) Essential hypertension


Current Visit: Yes   Status: Acute   Code(s): I10 - ESSENTIAL (PRIMARY) 

HYPERTENSION   SNOMED Code(s): 36135894


   


Plan: 


Chest x-ray continues to bilateral infiltrates, more so on the right side 

consistent with pulmonary edema.  Underlying pneumonia cannot be excluded.  

We'll start him on IV dobutamine.  His renal function is also deteriorating.  

Nephrology is following

## 2020-07-05 NOTE — P.PN
Subjective


Progress Note Date: 07/05/20


Principal diagnosis: 





Acute ST elevation myocardial infarction, and acute systolic congestive heart 

failure





This is a 79-year-old white male with history of hypertension, dyslipidemia, 

dementia, history of prostate cancer and previous radiation treatment, patient 

presented to the ER on 7/2/20, his chief complaint was mostly left-sided chest 

pain, started that same day, and it was at rest.  It was associated with 

diaphoresis and nausea.  Patient was found to have ST elevation in the inferior 

leads, and significantly elevated troponin.  Underwent cardiac catheterization, 

and stenting of the RCA.  Echocardiogram showed moderate severe LV dysfunction. 

And mild pulmonary hypertension.  Initial chest x-ray on admission showed 

interstitial alveolar edema, and over the last 24 hours, his chest x-ray 

significantly worsened, more interstitial edema is noted, and his O2 requirement

went up to 9 L.  Patient was transferred to the ICU, and I was asked to see him 

on consultation.  Chest x-ray clearly is suggestive of pulmonary edema, of 

course cannot rule out underlying pneumonia in the right lower lobe.  However on

presentation his presentation was mostly a presentation of chest pain, and 

coronary artery disease.  Pro-calcitonin level was noted to be a bit elevated, 

hence the patient was placed empirically on antibiotics.  Again the patient had 

no symptoms to suggest fever chills cough wheezing or hemoptysis.  No BNP level 

noted on admission, but his troponin level was 70.3.  PCR for covid 19 was 

negative.





Patient was reevaluated today on 7/5/20, patient is about the same, continues to

have slight worsening in his shortness of breath, remains on 5 L nasal cannula, 

high flow.  O2 saturation is marginal, chest x-ray is a bit worse, and it is 

more consistent with interstitial edema/pulmonary edema, and his BNP level is 

extremely elevated.  Of course underlying pneumonia is not entirely ruled out, 

but again I feel it is very much less likely based on his presentation as the 

patient presented with acute MI and went on to develop pulmonary edema with no 

symptoms to suggest pneumonia and no fever no chills and no cough prior to his 

presentation.  Patient is not improving with Lasix, and whether the patient will

benefit from inotropes, I will let the cardiologist decide regarding possible 

inotropes.  In the meantime I have recommended empiric antibiotics for his 

potential pneumonia and again clinically I feel this is less likely.  The only 

concern is that his pro-calcitonin level is a bit elevated.  But again his BNP 

is significantly elevated.  Blood pressure remains marginal, patient was placed 

on Dobutrex by cardiology earlier today.  He may even require pressors.  And he 

was seen by nephrology were and sodium bicarb was added, and his renal funct

ioning continues to work get worse, patient will likely end up being dialyzed





Objective





- Vital Signs


Vital signs: 


                                   Vital Signs











Temp  98.0 F   07/05/20 08:00


 


Pulse  73   07/05/20 11:00


 


Resp  26 H  07/05/20 11:00


 


BP  78/56   07/05/20 11:00


 


Pulse Ox  94 L  07/05/20 11:00








                                 Intake & Output











 07/04/20 07/05/20 07/05/20





 18:59 06:59 18:59


 


Intake Total  750 150


 


Output Total 0 125 75


 


Balance 0 625 75


 


Weight  79.2 kg 


 


Intake:   


 


  Oral  750 150


 


Output:   


 


  Urine 0 125 75


 


Other:   


 


  Voiding Method Bedside Commode Bedside Commode Bedside Commode


 


  # Voids 1 0 1


 


  # Bowel Movements 1  2














- Exam








Physical Exam: Revealed a 79-year-old white male in no distress, however he is 

on 5 L high flow nasal cannula.


Head: Atraumatic, normocephalic.


HEENT:[Neck is supple.] [No neck masses.] [No thyromegaly.] [No JVD.]


Chest:  Symmetrical chest expansion, crackles at the bases.  No rhonchi no 

wheezes.


Cardiac Exam: [Normal S1 and S2, no S3 gallop, 2/6 systolic murmur thought the 

precordium.


Abdomen: [Soft, nontender,  no megaly, no rebound, no guarding, normal bowel 

sounds.]


Extremities: [No clubbing, no edema, no cyanosis.]


Neurological Exam: [No focal neurologic deficit.]  Alert and oriented 3.


Skin: No rashes.


Psychiatric: Normal mood affect and normal mental status examination.








- Labs


CBC & Chem 7: 


                                 07/05/20 04:54





                                 07/05/20 04:54


Labs: 


                  Abnormal Lab Results - Last 24 Hours (Table)











  07/04/20 07/04/20 07/05/20 Range/Units





  15:37 18:26 04:54 


 


WBC    17.1 H  (3.8-10.6)  k/uL


 


RBC    3.58 L  (4.30-5.90)  m/uL


 


Hgb    12.2 L  (13.0-17.5)  gm/dL


 


Hct    36.0 L  (39.0-53.0)  %


 


MCV    100.6 H  (80.0-100.0)  fL


 


Neutrophils #    14.4 H  (1.3-7.7)  k/uL


 


Monocytes #    1.2 H  (0-1.0)  k/uL


 


Sodium     (137-145)  mmol/L


 


Chloride     ()  mmol/L


 


Carbon Dioxide     (22-30)  mmol/L


 


BUN     (9-20)  mg/dL


 


Creatinine     (0.66-1.25)  mg/dL


 


Glucose     (74-99)  mg/dL


 


Plasma Lactic Acid Francis  3.9 H*  3.8 H*   (0.7-2.0)  mmol/L














  07/05/20 Range/Units





  04:54 


 


WBC   (3.8-10.6)  k/uL


 


RBC   (4.30-5.90)  m/uL


 


Hgb   (13.0-17.5)  gm/dL


 


Hct   (39.0-53.0)  %


 


MCV   (80.0-100.0)  fL


 


Neutrophils #   (1.3-7.7)  k/uL


 


Monocytes #   (0-1.0)  k/uL


 


Sodium  128 L  (137-145)  mmol/L


 


Chloride  96 L  ()  mmol/L


 


Carbon Dioxide  21 L  (22-30)  mmol/L


 


BUN  65 H  (9-20)  mg/dL


 


Creatinine  2.31 H  (0.66-1.25)  mg/dL


 


Glucose  125 H  (74-99)  mg/dL


 


Plasma Lactic Acid Francis   (0.7-2.0)  mmol/L








                      Microbiology - Last 24 Hours (Table)











 07/04/20 18:40 Gram Stain - Preliminary





 Sputum Sputum Culture - Preliminary














Assessment and Plan


Assessment: 





Impression:


Acute inferior wall ST elevation myocardial infarction, status post stenting of 

RCA.


Acute pulmonary edema secondary to acute systolic congestive heart failure, 

ischemic cardiomyopathy.  ProBNP level is over 41,500


Benign essential hypertension.


Dyslipidemia.


History of mild dementia.


Possible right lower lobe pneumonia, however the presentation is not classic of 

pneumonia presentation.  However considering elevated pro calcitonin, hence will

continue Levaquin.


Acute kidney injury, multifactorial, I believe it is mostly related to 

hypotension, cardiac catheterization with contrast media, contrast induced 

kidney injury.





Recommendation:


Continue present treatment plan,


Agree with inotropes.


Continue oxygen.  Titrate accordingly.


Continue antibiotics empirically.


Continue diuretics , however if not responding with worsening renal profile, 

patient may end up requiring dialysis in the next 24-48 hours


Continue cardiac meds, including Plavix, statins, and beta blockers.


We'll continue to follow while in the ICU.


Prognosis is definitely guarded.


Time with Patient: Less than 30

## 2020-07-06 LAB
ANION GAP SERPL CALC-SCNC: 13 MMOL/L
ANION GAP SERPL CALC-SCNC: 14 MMOL/L
APTT BLD: 27 SEC (ref 22–30)
BASOPHILS # BLD AUTO: 0 K/UL (ref 0–0.2)
BASOPHILS NFR BLD AUTO: 0 %
BUN SERPL-SCNC: 85 MG/DL (ref 9–20)
BUN SERPL-SCNC: 86 MG/DL (ref 9–20)
CALCIUM SPEC-MCNC: 8.1 MG/DL (ref 8.4–10.2)
CALCIUM SPEC-MCNC: 8.3 MG/DL (ref 8.4–10.2)
CHLORIDE SERPL-SCNC: 94 MMOL/L (ref 98–107)
CHLORIDE SERPL-SCNC: 97 MMOL/L (ref 98–107)
CO2 SERPL-SCNC: 16 MMOL/L (ref 22–30)
CO2 SERPL-SCNC: 19 MMOL/L (ref 22–30)
EOSINOPHIL # BLD AUTO: 0.2 K/UL (ref 0–0.7)
EOSINOPHIL NFR BLD AUTO: 1 %
ERYTHROCYTE [DISTWIDTH] IN BLOOD BY AUTOMATED COUNT: 3.47 M/UL (ref 4.3–5.9)
ERYTHROCYTE [DISTWIDTH] IN BLOOD: 13.2 % (ref 11.5–15.5)
GLUCOSE SERPL-MCNC: 127 MG/DL (ref 74–99)
GLUCOSE SERPL-MCNC: 143 MG/DL (ref 74–99)
HCT VFR BLD AUTO: 34.3 % (ref 39–53)
HGB BLD-MCNC: 11.8 GM/DL (ref 13–17.5)
INR PPP: 1.1 (ref ?–1.2)
LYMPHOCYTES # SPEC AUTO: 1.1 K/UL (ref 1–4.8)
LYMPHOCYTES NFR SPEC AUTO: 8 %
MCH RBC QN AUTO: 34 PG (ref 25–35)
MCHC RBC AUTO-ENTMCNC: 34.4 G/DL (ref 31–37)
MCV RBC AUTO: 99.1 FL (ref 80–100)
MONOCYTES # BLD AUTO: 1 K/UL (ref 0–1)
MONOCYTES NFR BLD AUTO: 7 %
NEUTROPHILS # BLD AUTO: 12 K/UL (ref 1.3–7.7)
NEUTROPHILS NFR BLD AUTO: 83 %
PLATELET # BLD AUTO: 299 K/UL (ref 150–450)
POTASSIUM SERPL-SCNC: 4.1 MMOL/L (ref 3.5–5.1)
POTASSIUM SERPL-SCNC: 4.2 MMOL/L (ref 3.5–5.1)
PT BLD: 11.4 SEC (ref 9–12)
SODIUM SERPL-SCNC: 126 MMOL/L (ref 137–145)
SODIUM SERPL-SCNC: 127 MMOL/L (ref 137–145)
WBC # BLD AUTO: 14.6 K/UL (ref 3.8–10.6)

## 2020-07-06 RX ADMIN — MEMANTINE HYDROCHLORIDE SCH MG: 10 TABLET ORAL at 09:22

## 2020-07-06 RX ADMIN — FAMOTIDINE SCH MG: 20 TABLET, FILM COATED ORAL at 08:47

## 2020-07-06 RX ADMIN — DOBUTAMINE HYDROCHLORIDE SCH MLS/HR: 200 INJECTION INTRAVENOUS at 20:37

## 2020-07-06 RX ADMIN — LEVOFLOXACIN SCH MG: 250 TABLET, FILM COATED ORAL at 20:37

## 2020-07-06 RX ADMIN — HEPARIN SODIUM SCH UNIT: 5000 INJECTION, SOLUTION INTRAVENOUS; SUBCUTANEOUS at 08:46

## 2020-07-06 RX ADMIN — TAMSULOSIN HYDROCHLORIDE SCH MG: 0.4 CAPSULE ORAL at 08:47

## 2020-07-06 RX ADMIN — PSYLLIUM HUSK SCH GM: 3.4 POWDER ORAL at 11:23

## 2020-07-06 RX ADMIN — Medication SCH MG: at 20:37

## 2020-07-06 RX ADMIN — HEPARIN SODIUM SCH MLS/HR: 10000 INJECTION, SOLUTION INTRAVENOUS at 12:03

## 2020-07-06 RX ADMIN — Medication SCH MG: at 15:38

## 2020-07-06 RX ADMIN — DILTIAZEM HYDROCHLORIDE SCH: 5 INJECTION INTRAVENOUS at 17:32

## 2020-07-06 RX ADMIN — ATORVASTATIN CALCIUM SCH MG: 80 TABLET, FILM COATED ORAL at 20:37

## 2020-07-06 RX ADMIN — CITALOPRAM HYDROBROMIDE SCH MG: 20 TABLET ORAL at 08:47

## 2020-07-06 RX ADMIN — METOPROLOL TARTRATE SCH MG: 25 TABLET, FILM COATED ORAL at 12:05

## 2020-07-06 RX ADMIN — MEMANTINE HYDROCHLORIDE SCH MG: 10 TABLET ORAL at 20:36

## 2020-07-06 RX ADMIN — Medication SCH MG: at 08:47

## 2020-07-06 RX ADMIN — CLOPIDOGREL BISULFATE SCH MG: 75 TABLET ORAL at 08:47

## 2020-07-06 RX ADMIN — METOPROLOL TARTRATE SCH MG: 25 TABLET, FILM COATED ORAL at 20:36

## 2020-07-06 NOTE — XR
EXAMINATION TYPE: XR chest 1V portable

 

DATE OF EXAM: 7/6/2020

 

HISTORY: Shortness of breath.

 

COMPARISON: 7/5/2020

 

TECHNIQUE: Single view of the chest is submitted. Artifact limits evaluation of the upper chest.

 

FINDINGS:

Demonstrated are scattered senescent parenchymal change.  

 

Stable right lower lobe infiltrate and/or atelectasis with pleural effusion. Pulmonary venous congest
ion has improved.

 

The heart is stable.

 

Hilar and mediastinal structures are within normal limits.  

 

Degenerative changes are seen of the dorsal spine. 

 

 IMPRESSION: 

 

1.  Stable right lower lobe infiltrate and/or atelectasis with pleural effusion. Pulmonary venous con
gestion has improved.

## 2020-07-06 NOTE — P.PN
Subjective


Progress Note Date: 07/06/20


This is a 79-year-old gentleman with history of hypertension who presented with 

inferior wall myocardial infarction.  Patient had a cardiac catheterization and 

stent placement of the mid RCA.  His CPK was high in the troponin was in the 

range of 77 on admission.  His echo Cardigan showed an ejection fraction of 45%.

 Patient is complaining of some congestion and seemed to be mild short of 

breath.  Chest x-ray showed evidence of congestive heart failure.  Heart 

appeared to be regular.  No peripheral edema.  I'm going to start him on IV 

Lasix 40 mg and start him by mouth Lasix.  We'll also give potassium supplement.

 Rest of the medication to be continued.  We'll continue follow his input and 

output.  Follow blood work tomorrow.  Further condition depend upon the clinical

course.  We'll continue to monitor him in intensive care unit








07/04/2020: This patient is admitted to the hospital with inferior wall MI, 

probably subacute.  Patient had stent placement.  His eldest was an ejection 

fraction of 40% to 45% with hypokinesis of the inferior wall.  Patient has been 

short of breath.  A chest x-ray showed some evidence of possible pulmonary edema

and possible atypical pneumonia involving the right lower lobe.  Patient does 

have white count elevation.  Clinically he does have a slow murmur in the apical

area and also left sternal border.  Rule out possibility of mitral 

regurgitation.  I'm going to repeat the echocardiogram.  His urine output is 

fair.  Pulmonary consult is requested regarding possible pneumonia.





07/05/2020: This patient is a status post subacute inferior wall MI and stent 

placement.  Patient is still complaining of shortness of breath and congestion. 

His lungs show scattered rhonchi and mild wheezing.  Chest x-ray shows bilateral

findings suggestive of pulmonary edema more so on the right side.  Atypical 

pneumonia cannot be excluded.  His blood pressure is running low.  His 

creatinine is also going up.  I'm going to start him on dobutamine 5 mics per 

KG.  Patient is also on Lasix.  Continue rest of the medication.  His repeat 

echocardiogram did not reveal any significant valvular abnormalities.  Patient 

does have systolic murmur.  We'll make consider COURTNEY examination ,if  patient 

doesn't improve promptly





07/06/2020: This patient is status post subacute inferior wall MI and stent 

placement of the RCA.  Patient hasn't been having good urinary output.  He s

eemed to have acute tubular necrosis.  He was started on dobutamine IV.  There 

is some pickup in the urinary output.  Patient comes is not feeling well.  His 

chest x-ray showed some improvement in the vascular congestion.  There is 

infiltrated in the right base which could be pneumonia.  His her empirically on 

antibiotics.  Patient is being followed by nephrology.  We'll continue monitor 

his renal functions.  Further recommendations depend upon critical course.  

Clinically patient has a systolic murmur.  However echocardiogram did not show 

any significant valvular abnormalities.  He patient condition doesn't improve, 

may consider COURTNEY examination





Objective





- Vital Signs


Vital signs: 


                                   Vital Signs











Temp  97.5 F L  07/06/20 12:00


 


Pulse  84   07/06/20 13:00


 


Resp  24   07/06/20 13:00


 


BP  95/60   07/06/20 13:00


 


Pulse Ox  90 L  07/06/20 13:00








                                 Intake & Output











 07/05/20 07/06/20 07/06/20





 18:59 06:59 18:59


 


Intake Total 277.82 340 190


 


Output Total 225 90 290


 


Balance 52.82 250 -100


 


Weight  79.6 kg 


 


Intake:   


 


  IV 60 90 70


 


    0.9 60 90 70


 


  Intake, IV Titration 17.82  





  Amount   


 


    DOBUTamine DRIP 500 mg In 17.82  





    Dextrose/Water 1 250ml.   





    bag @ 2.5 MCG/KG/MIN 5.94   





    mls/hr IV .Q24H [facility] Rx#:   





    [id number]   


 


  Oral 200  120


 


  Blood Product  250 


 


Output:   


 


  Urine 225 90 290


 


Other:   


 


  Voiding Method Bedside Commode Bedside Commode Bedside Commode


 


  # Voids 1 0 0


 


  # Bowel Movements 1 2 2














- Exam





GENERAL EXAM: Patient is alert and oriented and appears to be in mild 

respiratory distress and cough


HEENT: Normocephalic. Normal reaction of pupils, equal size, normal range of 

extraocular motion. No erythema or exudates in the throat.


NECK: No masses, no nuchal rigidity.


CHEST: No chest wall deformity.


LUNGS: Scattered rhonchi and wheezes


HEART: S1, S2 heard.  Systolic murmur heard at the apex and left sternal border


ABDOMEN: No hepatosplenomegaly, normal bowel sounds, no guarding or rigidity.


SKIN: No rashes


CENTRAL NERVOUS SYSTEM: No focal deficits.


EXTREMITIES: No cyanosis, clubbing or edema.





- Labs


CBC & Chem 7: 


                                 07/06/20 05:13





                                 07/06/20 11:37


Labs: 


                  Abnormal Lab Results - Last 24 Hours (Table)











  07/06/20 07/06/20 07/06/20 Range/Units





  05:13 05:13 11:37 


 


WBC  14.6 H    (3.8-10.6)  k/uL


 


RBC  3.47 L    (4.30-5.90)  m/uL


 


Hgb  11.8 L    (13.0-17.5)  gm/dL


 


Hct  34.3 L    (39.0-53.0)  %


 


Neutrophils #  12.0 H    (1.3-7.7)  k/uL


 


Sodium   126 L  127 L  (137-145)  mmol/L


 


Chloride   97 L  94 L  ()  mmol/L


 


Carbon Dioxide   16 L  19 L  (22-30)  mmol/L


 


BUN   86 H  85 H  (9-20)  mg/dL


 


Creatinine   2.32 H  2.27 H  (0.66-1.25)  mg/dL


 


Glucose   127 H  143 H  (74-99)  mg/dL


 


Calcium   8.3 L  8.1 L  (8.4-10.2)  mg/dL








                      Microbiology - Last 24 Hours (Table)











 07/04/20 18:40 Gram Stain - Final





 Sputum Sputum Culture - Final














Assessment and Plan


(1) Acute transmural inferior wall MI


Current Visit: Yes   Status: Acute   Code(s): I21.19 - STEMI INVOLVING OTH 

CORONARY ARTERY OF INFERIOR WALL   SNOMED Code(s): 96349841


   





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


Current Visit: Yes   Status: Acute   Code(s): I50.41 - ACUTE COMBINED SYSTOLIC 

AND DIASTOLIC (CONGESTIVE) HRT FAIL   SNOMED Code(s): 863793042893368


   





(3) Essential hypertension


Current Visit: Yes   Status: Acute   Code(s): I10 - ESSENTIAL (PRIMARY) 

HYPERTENSION   SNOMED Code(s): 63211096


   


Plan: 


Overall patient condition remained stable with a stable creatinine.  Urine 

output is marginally better.  He is on IV dobutamine.  He is having intermittent

intermittent bouts of atrial fibrillation.  Patient will be started on heparin. 

May consider starting amiodarone.  If he has any recurrence of atrial 

fibrillation.  Currently in sinus rhythm

## 2020-07-06 NOTE — PN
PROGRESS NOTE



Patient is seen for followup for acute kidney injury.  Patient has been started on

dobutamine.  His urine output is not accurately charted.  However, he did void 160 mL

today.  Patient's blood pressure has been stable.  No episodes of hypotension.

However, I do see a systolic pressure of 97 mmHg earlier today.  Renal function is

about the same with creatinine at 2.3-2.2 mg/dL.  Lactic acid was elevated at 3.8.  I

do not see a repeat level drawn.  Patient is comfortable.  He is not short of breath.



PHYSICAL EXAMINATION:

Blood pressure 102/59, heart rate 81 per minute, he is afebrile.

Examination of the heart S1, S2.  Examination of the lungs, decreased breath sounds at

the bases.  Abdomen is soft, nontender.  Examination of the lower extremities shows no

significant edema.



LABS:

Show sodium 126, potassium 4.2, chloride 97, BUN 86, creatinine 2.32, hemoglobin 11.8

g/dL.



ASSESSMENT:

1. Acute kidney injury ATN secondary to hemodynamic instability as well as contrast

    nephropathy.  Currently nonoliguric; however, urine output is on the lower side.

    We can continue with the dobutamine and continue off IV fluids and place Calderon

    catheter if the patient does not void again this shift.

2. Status post acute MI, status post cardiac catheterization, coronary stent

    placement.

3. Cardiomyopathy, ejection fraction 45% to 50%.

4. Possible pneumonia maintained on empiric antibiotics.

5. Hyponatremia associated with renal failure and hypotonic fluid with dobutamine.

    Continue with the sodium bicarb and repeat electrolytes at noon.

6. Metabolic acidosis maintained on oral sodium bicarb, mainly non gap secondary to

    renal failure as well as diarrhea.



PLAN:

Replace Calderon catheter if the patient does not void to record accurate Is and Os and

continue off IV fluids for now.  Continue to avoid nephrotoxic agents.  Avoid

hypotension.





MMODL / IJN: [id number] / Job#: [id number]

## 2020-07-06 NOTE — P.PN
Subjective





79 years old male with past medical history of hypertension, hyperlipidemia, 

dementia, prostate cancer status post radiotherapy , patient presents because of

chest pain found to have STEMI, he underwent cardiac cath status post stent 

placement in the right coronary artery.  He is in the ICU.


  His creatinine increased today to 1.4, WBC is high at 18.6 and 19.6 K, sodium 

131, his chest x-ray showing pulmonary congestion with possible interstitial 

pneumonia


He saturating 90s on 7 L/m oxygen via nasal cannula.


Cardiologist already given 1 dose of Lasix IV and continued on 40 mg by mouth 

daily, 1 going to give him another dose of Lasix.  Blood pressure is on the low 

normal but he is tachypneic at 35.  A but that side nurse his breathing is li

ttle better in the evening than in the morning and he did not need BiPAP for 

now.


He is also on aspirin and Plavix, he is on metoprolol 12.5 mg and lisinopril 2.5

mg, we going to hold lisinopril for now


Also call pulmonary and nephrology consult





07/04/2020


Patient breathing easier but however his significantly tachypneic and mid 20s, 

he saturating 94% on 9 L oxygen via nasal cannula.  His blood pressure 96/57 and

heart rate 77.


He still bleeding from dyspnea but no chest pain, he has little cough with clear

phlegm.


This morning he was trying to get out of bed to the restroom by himself when he 

fell on hit his head in the forehead area.


Chest x-ray: Pulmonary edema, atypical pneumonia, some right lower lobe 

pneumonia


WBC this morning is 18.1 K with slight improvement.  Sodium 132, creatinine 

stable at 1.5.  Procol stone and is elevated at 0.23


Patient is currently on by mouth Lasix, lisinopril is held, cardiologist 

recommended to continue with metoprolol 12.5, we going to check with cardiology 

about holding parameters.  Continue with Levaquin.


Ordered EKG








7/5/20


Patient awake and alert, his breathing is easier although he still complains 

from some chest pain with little dyspnea and the 10 cough.


He is saturating 94% on 3-5 L of oxygen via nasal cannula.  Rest of Vitas looks 

stable, however her blood pressure is on the low side, this morning was 78/56-

94/59 


WBC is 17.1 K, hemoglobin 12.2, sodium 128, creatinine went up to 2.3.  Lactic 

acid is elevated at 3.8.  ProBNP is 41 500 


sputum culture is pending.  Chest x-ray showing stable changes with diffuse 

parenchymal changes or diffuse pneumonia or CHF/pulmonary edema


Cardiology started the patient on dobutamine drip, also he got 1 dose of Lasix 

60 mg IV 1 and on sodium bicarbonate tablets.


Patient also is on Levaquin 250 mg by mouth daily, which is renal dose.  

Pulmonary nephrology and cardiology teams on the case





07/06/2020


Patient is still complaining of shortness of breath patient has significant 

rhonchi streaking heart failure exacerbation patient is presently on dobutamine 

drip does have atrial fibrillation for which patient is on Cardizem, patient is 

still hyponatremic receiving sodium bicarbonate tablets is also on Lasix, 

patient is on metoprolol as well.  Patient was started on IV heparin.  We will d

yspnea Cardizem as it decreases as it [facility] heart failure.  

Nephrology is following the patient patient probably has hypovolemic 

hyponatremia





Constitutional: Denied any fatigue denied any fever.


Cardio vascular: denied any chest pain, palpitations


Gastrointestinal denied any nausea vomiting


Pulmonary: Patient is still not feeling better still short of breath


Neurologic denied any new focal deficits





All inpatient medications were reviewed and appropriate changes in these 

medications as dictated in the interval history and assessment and plan.








Objective





- Vital Signs


Vital signs: 


                                   Vital Signs











Temp  97.5 F L  07/06/20 12:00


 


Pulse  84   07/06/20 13:00


 


Resp  24   07/06/20 13:00


 


BP  95/60   07/06/20 13:00


 


Pulse Ox  90 L  07/06/20 13:00








                                 Intake & Output











 07/05/20 07/06/20 07/06/20





 18:59 06:59 18:59


 


Intake Total 277.82 340 190


 


Output Total 225 90 290


 


Balance 52.82 250 -100


 


Weight  79.6 kg 


 


Intake:   


 


  IV 60 90 70


 


    0.9 60 90 70


 


  Intake, IV Titration 17.82  





  Amount   


 


    DOBUTamine DRIP 500 mg In 17.82  





    Dextrose/Water 1 250ml.   





    bag @ 2.5 MCG/KG/MIN 5.94   





    mls/hr IV .Q24H [facility] Rx#:   





    [id number]   


 


  Oral 200  120


 


  Blood Product  250 


 


Output:   


 


  Urine 225 90 290


 


Other:   


 


  Voiding Method Bedside Commode Bedside Commode Bedside Commode


 


  # Voids 1 0 0


 


  # Bowel Movements 1 2 2














- Exam


GENERAL: The patient is alert and oriented x3, not in any acute distress. Well 

developed, well nourished. 


HEENT: Pupils are round and equally reacting to light. EOMI. No scleral icterus.

No conjunctival pallor. Normocephalic, atraumatic. No pharyngeal erythema. No 

thyromegaly. 


CARDIOVASCULAR: S1 and S2 present. No murmurs, rubs, or gallops.  Does have 

elevated JVD


-PULMONARY: Diffuse bilateral crackles


ABDOMEN: Soft, nontender, nondistended, normoactive bowel sounds. No palpable 

organomegaly. 


MUSCULOSKELETAL: No joint swelling or deformity. 


EXTREMITIES: No cyanosis, clubbing, or pedal edema. 


NEUROLOGICAL: Gross neurological examination did not reveal any focal deficits. 


SKIN: No rashes. no petechiae.








- Labs


CBC & Chem 7: 


                                 07/06/20 05:13





                                 07/06/20 11:37


Labs: 


                  Abnormal Lab Results - Last 24 Hours (Table)











  07/06/20 07/06/20 07/06/20 Range/Units





  05:13 05:13 11:37 


 


WBC  14.6 H    (3.8-10.6)  k/uL


 


RBC  3.47 L    (4.30-5.90)  m/uL


 


Hgb  11.8 L    (13.0-17.5)  gm/dL


 


Hct  34.3 L    (39.0-53.0)  %


 


Neutrophils #  12.0 H    (1.3-7.7)  k/uL


 


Sodium   126 L  127 L  (137-145)  mmol/L


 


Chloride   97 L  94 L  ()  mmol/L


 


Carbon Dioxide   16 L  19 L  (22-30)  mmol/L


 


BUN   86 H  85 H  (9-20)  mg/dL


 


Creatinine   2.32 H  2.27 H  (0.66-1.25)  mg/dL


 


Glucose   127 H  143 H  (74-99)  mg/dL


 


Calcium   8.3 L  8.1 L  (8.4-10.2)  mg/dL








                      Microbiology - Last 24 Hours (Table)











 07/04/20 18:40 Gram Stain - Final





 Sputum Sputum Culture - Final














Assessment and Plan


Plan: 


-STEMI status post cardiac cath and stent placement in the RCA


Acute hypoxic respiratory failure secondary to congestive heart failure because 

systolic dysfunction with the acute exacerbation


-Cardiac shock: Continue with the dobutamine, Cardizem will be discontinued, 


-New-onset atrial fibrillation continue with heparin continue with beta blocker 

patient was started on amiodarone if he goes back into A. fib, unfortunately we 

cannot use digoxin because of his acute renal failure


Acute kidney injury prerenal azotemia secondary to heart failure exacerbation


-Hypervolemic hyponatremia secondary to heart failure


Hypotension


Fall, with no dizziness or syncope


-Patient is on levofloxacin because of possibly of pneumonia


Hypertension


Hyperlipidemia


History of prostate cancer status post radiotherapy

## 2020-07-06 NOTE — PN
PROGRESS NOTE



PULMONARY/CRITICAL CARE PROGRESS NOTE:



DATE OF SERVICE:

07/06/2020



This is a 79-year-old male admitted on July 2 for ST-segment elevation myocardial

infarction.  He had a stent placed in the right coronary artery.  The patient was

admitted with a diagnosis of heart failure, hypoxemia, and atrial fibrillation.

Currently doing much better.  He is on 3 L nasal cannula.  He is receiving IV

dobutamine at 3.5 mcg/kg per minute.  The Cardizem drip was on hold, but was restarted

this morning at 5 mg an hour by Mendoza, the nurse.  Currently, the patient has no

particular complaints.  He denies any chest pain or chest discomfort.  He is not short

of breath.  He denies any fever or chills.  Denies any nausea, vomiting, diarrhea, or

abdominal pain.



Current vital signs are reviewed, temperature 98.1, heart rate 76, respiratory rate 22,

blood pressure 105/70, mean 81; 3 L saturation 94%.  Appears in no acute distress.

HEENT: Examination is grossly unremarkable.  Mucous membranes are moist.  No oral

lesions.

NECK:  Supple.  Full range of motion.  No adenopathy or thyromegaly.  Neck veins are

flat.

CARDIOVASCULAR: Examination reveals regular rhythm and rate.  Heart rate 75 beats per

minute.  S1, S2 normal.  No murmur.

LUNGS:  Reveal some mild crackles bilaterally.  A few scattered rhonchi.  No wheezes.

Breath sounds equal.

ABDOMEN:  Soft, bowel sounds are heard.

EXTREMITIES:  Intact.  No cyanosis, clubbing, or edema.

SKIN: Without rash.

NEUROLOGIC:  Examination is nonfocal.



White count 14.6, hemoglobin 11.8, hematocrit 34.3, platelet count 299,000, sodium 126,

potassium 4.2, chloride 97, CO2 is 16 anion gap is 13. BUN and creatinine were 86 and

2.32.  Lactic acid when last checked was 3.8. N terminal proBNP 41,500.



Microbiologic studies are negative.



Chest x-ray is consistent with mild fluid overload.  There is also an infiltrate in the

right lower lobe.  This could be consistent with atelectasis and/or pneumonia and/or

pleural effusion.



Overall, the chest x-ray has improved.



CURRENT MEDICATIONS:

Reviewed.  Currently, the patient is on Lipitor, atropine, Cardizem drip, Celexa,

Plavix, dobutamine, Pepcid, subcu heparin, Levaquin, Maalox, Mag-Ox, Namenda,

metoprolol, Narcan, sublingual nitroglycerin, bicarbonate tablets, Flomax, trazodone,

and Ambien.



ASSESSMENT:

1. Acute inferior wall ST-segment elevation myocardial infarction, status post

    stenting of the right coronary artery.

2. Acute pulmonary edema secondary to acute systolic congestive heart failure and

    ischemic cardiomyopathy.

3. Benign essential hypertension.

4. Hyperlipidemia.

5. History of mild dementia.

6. Possible right lower lobe pneumonia.

7. Acute kidney injury.



PLAN:

Currently, the patient seems to be doing better.  He has been titrated down to 3 L

nasal cannula.  He is receiving IV dobutamine at 2.5 mcg/kg per minute.  The Cardizem

drip was held, but now is restarted at 5 mg an hour.  Medications are reviewed.  Labs

are reviewed.  Chest x-ray is improved.  Will continue on antibiotics.  Medications are

reviewed.  Unnecessary medications are discontinued.





MMODL / IJN: [id number] / Job#: [id number]

## 2020-07-07 LAB
ANION GAP SERPL CALC-SCNC: 13 MMOL/L
ANION GAP SERPL CALC-SCNC: 14 MMOL/L
BASOPHILS # BLD AUTO: 0 K/UL (ref 0–0.2)
BASOPHILS NFR BLD AUTO: 0 %
BUN SERPL-SCNC: 89 MG/DL (ref 9–20)
BUN SERPL-SCNC: 96 MG/DL (ref 9–20)
CALCIUM SPEC-MCNC: 7.6 MG/DL (ref 8.4–10.2)
CALCIUM SPEC-MCNC: 7.9 MG/DL (ref 8.4–10.2)
CHLORIDE SERPL-SCNC: 92 MMOL/L (ref 98–107)
CHLORIDE SERPL-SCNC: 93 MMOL/L (ref 98–107)
CO2 BLDA-SCNC: 16 MMOL/L (ref 19–24)
CO2 SERPL-SCNC: 19 MMOL/L (ref 22–30)
CO2 SERPL-SCNC: 20 MMOL/L (ref 22–30)
EOSINOPHIL # BLD AUTO: 0.1 K/UL (ref 0–0.7)
EOSINOPHIL NFR BLD AUTO: 1 %
ERYTHROCYTE [DISTWIDTH] IN BLOOD BY AUTOMATED COUNT: 3.33 M/UL (ref 4.3–5.9)
ERYTHROCYTE [DISTWIDTH] IN BLOOD: 13.1 % (ref 11.5–15.5)
GLUCOSE BLD-MCNC: 130 MG/DL (ref 75–99)
GLUCOSE BLD-MCNC: 145 MG/DL (ref 75–99)
GLUCOSE BLD-MCNC: 150 MG/DL (ref 75–99)
GLUCOSE SERPL-MCNC: 118 MG/DL (ref 74–99)
GLUCOSE SERPL-MCNC: 154 MG/DL (ref 74–99)
HCO3 BLDA-SCNC: 15 MMOL/L (ref 21–25)
HCT VFR BLD AUTO: 33.4 % (ref 39–53)
HGB BLD-MCNC: 11.2 GM/DL (ref 13–17.5)
LYMPHOCYTES # SPEC AUTO: 1.1 K/UL (ref 1–4.8)
LYMPHOCYTES NFR SPEC AUTO: 7 %
MAGNESIUM SPEC-SCNC: 3 MG/DL (ref 1.6–2.3)
MCH RBC QN AUTO: 33.5 PG (ref 25–35)
MCHC RBC AUTO-ENTMCNC: 33.4 G/DL (ref 31–37)
MCV RBC AUTO: 100.4 FL (ref 80–100)
MONOCYTES # BLD AUTO: 1.2 K/UL (ref 0–1)
MONOCYTES NFR BLD AUTO: 7 %
NEUTROPHILS # BLD AUTO: 13.9 K/UL (ref 1.3–7.7)
NEUTROPHILS NFR BLD AUTO: 84 %
PCO2 BLDA: 26 MMHG (ref 35–45)
PH BLDA: 7.37 [PH] (ref 7.35–7.45)
PLATELET # BLD AUTO: 329 K/UL (ref 150–450)
PO2 BLDA: 247 MMHG (ref 83–108)
POTASSIUM SERPL-SCNC: 3.7 MMOL/L (ref 3.5–5.1)
POTASSIUM SERPL-SCNC: 4 MMOL/L (ref 3.5–5.1)
SODIUM SERPL-SCNC: 124 MMOL/L (ref 137–145)
SODIUM SERPL-SCNC: 127 MMOL/L (ref 137–145)
WBC # BLD AUTO: 16.6 K/UL (ref 3.8–10.6)

## 2020-07-07 PROCEDURE — 5A1223Z PERFORMANCE OF CARDIAC PACING, CONTINUOUS: ICD-10-PCS

## 2020-07-07 PROCEDURE — 5A1955Z RESPIRATORY VENTILATION, GREATER THAN 96 CONSECUTIVE HOURS: ICD-10-PCS

## 2020-07-07 PROCEDURE — 0BH17EZ INSERTION OF ENDOTRACHEAL AIRWAY INTO TRACHEA, VIA NATURAL OR ARTIFICIAL OPENING: ICD-10-PCS

## 2020-07-07 PROCEDURE — 3E033XZ INTRODUCTION OF VASOPRESSOR INTO PERIPHERAL VEIN, PERCUTANEOUS APPROACH: ICD-10-PCS

## 2020-07-07 RX ADMIN — PSYLLIUM HUSK SCH GM: 3.4 POWDER ORAL at 08:05

## 2020-07-07 RX ADMIN — DOBUTAMINE HYDROCHLORIDE SCH: 200 INJECTION INTRAVENOUS at 11:23

## 2020-07-07 RX ADMIN — INSULIN ASPART SCH: 100 INJECTION, SOLUTION INTRAVENOUS; SUBCUTANEOUS at 18:26

## 2020-07-07 RX ADMIN — CLOPIDOGREL BISULFATE SCH MG: 75 TABLET ORAL at 08:05

## 2020-07-07 RX ADMIN — CITALOPRAM HYDROBROMIDE SCH MG: 20 TABLET ORAL at 08:05

## 2020-07-07 RX ADMIN — ATORVASTATIN CALCIUM SCH MG: 80 TABLET, FILM COATED ORAL at 21:48

## 2020-07-07 RX ADMIN — Medication SCH MG: at 15:59

## 2020-07-07 RX ADMIN — DOBUTAMINE HYDROCHLORIDE SCH MLS/HR: 200 INJECTION INTRAVENOUS at 21:15

## 2020-07-07 RX ADMIN — Medication SCH MG: at 21:47

## 2020-07-07 RX ADMIN — Medication SCH MG: at 08:05

## 2020-07-07 RX ADMIN — FAMOTIDINE SCH MG: 20 TABLET, FILM COATED ORAL at 08:05

## 2020-07-07 RX ADMIN — PROPOFOL SCH MLS/HR: 10 INJECTION, EMULSION INTRAVENOUS at 15:59

## 2020-07-07 RX ADMIN — CHLORHEXIDINE GLUCONATE SCH ML: 1.2 RINSE ORAL at 21:47

## 2020-07-07 RX ADMIN — PROPOFOL SCH MLS/HR: 10 INJECTION, EMULSION INTRAVENOUS at 21:47

## 2020-07-07 RX ADMIN — METOPROLOL TARTRATE SCH MG: 25 TABLET, FILM COATED ORAL at 08:05

## 2020-07-07 RX ADMIN — INSULIN ASPART SCH UNIT: 100 INJECTION, SOLUTION INTRAVENOUS; SUBCUTANEOUS at 13:57

## 2020-07-07 RX ADMIN — MEMANTINE HYDROCHLORIDE SCH MG: 10 TABLET ORAL at 08:05

## 2020-07-07 RX ADMIN — HEPARIN SODIUM SCH MLS/HR: 10000 INJECTION, SOLUTION INTRAVENOUS at 13:40

## 2020-07-07 RX ADMIN — PROPOFOL SCH MLS/HR: 10 INJECTION, EMULSION INTRAVENOUS at 13:33

## 2020-07-07 RX ADMIN — LEVOFLOXACIN SCH MG: 250 TABLET, FILM COATED ORAL at 21:48

## 2020-07-07 RX ADMIN — METOPROLOL TARTRATE SCH MG: 25 TABLET, FILM COATED ORAL at 21:47

## 2020-07-07 RX ADMIN — NOREPINEPHRINE BITARTRATE SCH MLS/HR: 1 INJECTION, SOLUTION, CONCENTRATE INTRAVENOUS at 14:36

## 2020-07-07 RX ADMIN — TAMSULOSIN HYDROCHLORIDE SCH MG: 0.4 CAPSULE ORAL at 08:05

## 2020-07-07 NOTE — P.PN
Progress Note - Text


Progress Note Date: 07/07/20





CODE BLUE note





This is a late note entry.  CODE BLUE was called around 9:45 AM.  Apparently, bonita ugerrero went into bradycardia and then PEA.  High-quality chest compressions were 

started.  Please refer to the code for further details.  Patient was intubated. 

ROSC was achieved.  Patient was given a 1 L bolus.  EKG was performed.  

Cardiology was notified. Nunu Cooley NP was present during the CODE BLUE. 

Primary team was notified.

## 2020-07-07 NOTE — P.PN
Subjective


Progress Note Date: 07/07/20


This is a 79-year-old gentleman with history of hypertension who presented with 

inferior wall myocardial infarction.  Patient had a cardiac catheterization and 

stent placement of the mid RCA.  His CPK was high in the troponin was in the 

range of 77 on admission.  His echo Cardigan showed an ejection fraction of 45%.

 Patient is complaining of some congestion and seemed to be mild short of 

breath.  Chest x-ray showed evidence of congestive heart failure.  Heart 

appeared to be regular.  No peripheral edema.  I'm going to start him on IV 

Lasix 40 mg and start him by mouth Lasix.  We'll also give potassium supplement.

 Rest of the medication to be continued.  We'll continue follow his input and 

output.  Follow blood work tomorrow.  Further condition depend upon the clinical

course.  We'll continue to monitor him in intensive care unit








07/04/2020: This patient is admitted to the hospital with inferior wall MI, 

probably subacute.  Patient had stent placement.  His eldest was an ejection 

fraction of 40% to 45% with hypokinesis of the inferior wall.  Patient has been 

short of breath.  A chest x-ray showed some evidence of possible pulmonary edema

and possible atypical pneumonia involving the right lower lobe.  Patient does 

have white count elevation.  Clinically he does have a slow murmur in the apical

area and also left sternal border.  Rule out possibility of mitral 

regurgitation.  I'm going to repeat the echocardiogram.  His urine output is 

fair.  Pulmonary consult is requested regarding possible pneumonia.





07/05/2020: This patient is a status post subacute inferior wall MI and stent 

placement.  Patient is still complaining of shortness of breath and congestion. 

His lungs show scattered rhonchi and mild wheezing.  Chest x-ray shows bilateral

findings suggestive of pulmonary edema more so on the right side.  Atypical 

pneumonia cannot be excluded.  His blood pressure is running low.  His 

creatinine is also going up.  I'm going to start him on dobutamine 5 mics per 

KG.  Patient is also on Lasix.  Continue rest of the medication.  His repeat 

echocardiogram did not reveal any significant valvular abnormalities.  Patient 

does have systolic murmur.  We'll make consider COURTNEY examination ,if  patient 

doesn't improve promptly





07/06/2020: This patient is status post subacute inferior wall MI and stent 

placement of the RCA.  Patient hasn't been having good urinary output.  He s

eemed to have acute tubular necrosis.  He was started on dobutamine IV.  There 

is some pickup in the urinary output.  Patient comes is not feeling well.  His 

chest x-ray showed some improvement in the vascular congestion.  There is 

infiltrated in the right base which could be pneumonia.  His her empirically on 

antibiotics.  Patient is being followed by nephrology.  We'll continue monitor 

his renal functions.  Further recommendations depend upon critical course.  

Clinically patient has a systolic murmur.  However echocardiogram did not show 

any significant valvular abnormalities.  He patient condition doesn't improve, 

may consider COURTNEY examination.





07/07/2020: This patient is admitted with subacute inferior wall MI and had 

stent placement of the RCA.  Patient also has severe iliac disease bilaterally. 

Patient hasn't been progressing well.  Developed possible pneumonia and CHF and 

also renal failure.  Urine output has been low.  Patient was started on 

dobutamine.  Patient also had intermittent atrial fibrillation.  He was 

initiated on amiodarone yesterday.  Today he developed severe bradycardia and 

cardiac arrest.  Patient subsequently had a temporary pacemaker implantation.  

He still remains hypotensive.  He is on dobutamine and we also going to start 

him on Levophed.  Discussed with family about CODE STATUS and extent of care.  

They're going to discuss and decide that they did consent to proceed with 

temporary pacemaker.  Does have systolic murmur.  A repeat echocardiogram also 

showed mild mitral regurgitation.  Aortic valve function is normal.  We'll 

continue current medical therapy.  It.  Prognosis is guarded.  His creatinine is

about 2.67.  Nephrology is also following the case





Objective





- Vital Signs


Vital signs: 


                                   Vital Signs











Temp  97.4 F L  07/07/20 12:00


 


Pulse  93   07/07/20 14:00


 


Resp  16   07/07/20 14:00


 


BP  80/56   07/07/20 14:00


 


Pulse Ox  96   07/07/20 14:00








                                 Intake & Output











 07/06/20 07/07/20 07/07/20





 18:59 06:59 18:59


 


Intake Total 597.18 237.9 1868.244


 


Output Total 365 455 155


 


Balance 232.18 -217.1 1713.244


 


Weight  74.4 kg 78.5 kg


 


Intake:   


 


   237.9 282.7


 


    0.9 120 120 80


 


    0.9NS Sheath   60


 


    Amiodarone 300 mg In   75





    Dextrose 5% in Water 250   





    ml @ 0.5 MG/MIN 25 mls/hr   





    IV .Q10H [facility] Rx#:   





    [id number]   


 


    Amiodarone 360 mg In  99.9 33.3





    Dextrose 5% in Water 200   





    ml @ 1 MG/MIN 33.333 mls/   





    hr IV .Q6H [facility] Rx#:   





    [id number]   


 


    DOBUTamine DRIP 500 mg In  18 34.4





    Dextrose/Water 1 250ml.   





    bag @ 2.5 MCG/KG/MIN 5.94   





    mls/hr IV .Q24H [facility] Rx#:   





    [id number]   


 


  Intake, IV Titration 357.18  1525.544





  Amount   


 


    Amiodarone 300 mg In   184.167





    Dextrose 5% in Water 250   





    ml @ 0.5 MG/MIN 25 mls/hr   





    IV .Q10H [facility] Rx#:   





    [id number]   


 


    DOBUTamine DRIP 500 mg In 232.18  91.377





    Dextrose/Water 1 250ml.   





    bag @ 2.5 MCG/KG/MIN 5.94   





    mls/hr IV .Q24H [facility] Rx#:   





    [id number]   


 


    Diltiazem 125 mg In 125.00  





    Sodium Chloride 0.9% 100   





    ml @ 7.5 MG/HR 7.5 mls/hr   





    IV .W00A21N [facility] Rx#:   





    [id number]   


 


    Heparin Sod,Pork in 0.45%   250.000





    NaCl 25,000 unit In 0.45   





    % NaCl 1 250ml.bag @ 12.5   





    UNITS/KG/HR 9.95 mls/hr   





    IV .Q24H [facility] Rx#:   





    [id number]   


 


    Sodium Chloride 0.9% 1,   1000





    000 ml @ 999 mls/hr IV .   





    Q1H1M [facility] Rx#:[id number]   


 


  Oral 120  60


 


Output:   


 


  Urine 365 455 155


 


Other:   


 


  Voiding Method Bedside Commode Indwelling Catheter Indwelling Catheter


 


  # Voids 0  


 


  # Bowel Movements 2  














- Exam





GENERAL EXAM: Patient is intubated and sedated


HEENT: Normocephalic. Normal reaction of pupils, equal size, normal range of 

extraocular motion. No erythema or exudates in the throat.


NECK: No masses, no nuchal rigidity.


CHEST: No chest wall deformity.


LUNGS: Scattered rhonchi and wheezes


HEART: S1, S2 heard.  Systolic murmur heard at the apex and left sternal border


ABDOMEN: No hepatosplenomegaly, normal bowel sounds, no guarding or rigidity.


SKIN: No rashes


CENTRAL NERVOUS SYSTEM: No focal deficits.


EXTREMITIES: No cyanosis, clubbing or edema.





- Labs


CBC & Chem 7: 


                                 07/07/20 04:40





                                 07/07/20 04:51


Labs: 


                  Abnormal Lab Results - Last 24 Hours (Table)











  07/06/20 07/06/20 07/07/20 Range/Units





  17:08 22:54 04:40 


 


WBC    16.6 H  (3.8-10.6)  k/uL


 


RBC    3.33 L  (4.30-5.90)  m/uL


 


Hgb    11.2 L  (13.0-17.5)  gm/dL


 


Hct    33.4 L  (39.0-53.0)  %


 


MCV    100.4 H  (80.0-100.0)  fL


 


Neutrophils #    13.9 H  (1.3-7.7)  k/uL


 


Monocytes #    1.2 H  (0-1.0)  k/uL


 


APTT  58.3 H    (22.0-30.0)  sec


 


ABG pCO2     (35-45)  mmHg


 


ABG pO2     ()  mmHg


 


ABG HCO3     (21-25)  mmol/L


 


ABG Total CO2     (19-24)  mmol/L


 


ABG O2 Saturation     (94-97)  %


 


Sodium   124 L   (137-145)  mmol/L


 


Chloride   92 L   ()  mmol/L


 


Carbon Dioxide   19 L   (22-30)  mmol/L


 


BUN   89 H   (9-20)  mg/dL


 


Creatinine   2.79 H   (0.66-1.25)  mg/dL


 


Glucose   118 H   (74-99)  mg/dL


 


POC Glucose (mg/dL)     (75-99)  mg/dL


 


Calcium   7.9 L   (8.4-10.2)  mg/dL


 


Magnesium   3.0 H   (1.6-2.3)  mg/dL














  07/07/20 07/07/20 07/07/20 Range/Units





  04:40 04:51 04:51 


 


WBC     (3.8-10.6)  k/uL


 


RBC     (4.30-5.90)  m/uL


 


Hgb     (13.0-17.5)  gm/dL


 


Hct     (39.0-53.0)  %


 


MCV     (80.0-100.0)  fL


 


Neutrophils #     (1.3-7.7)  k/uL


 


Monocytes #     (0-1.0)  k/uL


 


APTT  60.6 H    (22.0-30.0)  sec


 


ABG pCO2     (35-45)  mmHg


 


ABG pO2     ()  mmHg


 


ABG HCO3     (21-25)  mmol/L


 


ABG Total CO2     (19-24)  mmol/L


 


ABG O2 Saturation     (94-97)  %


 


Sodium   127 L   (137-145)  mmol/L


 


Chloride   93 L   ()  mmol/L


 


Carbon Dioxide   20 L   (22-30)  mmol/L


 


BUN   96 H   (9-20)  mg/dL


 


Creatinine   2.69 H   (0.66-1.25)  mg/dL


 


Glucose   154 H   (74-99)  mg/dL


 


POC Glucose (mg/dL)     (75-99)  mg/dL


 


Calcium   7.6 L   (8.4-10.2)  mg/dL


 


Magnesium    3.2 H  (1.6-2.3)  mg/dL














  07/07/20 07/07/20 Range/Units





  12:19 13:50 


 


WBC    (3.8-10.6)  k/uL


 


RBC    (4.30-5.90)  m/uL


 


Hgb    (13.0-17.5)  gm/dL


 


Hct    (39.0-53.0)  %


 


MCV    (80.0-100.0)  fL


 


Neutrophils #    (1.3-7.7)  k/uL


 


Monocytes #    (0-1.0)  k/uL


 


APTT    (22.0-30.0)  sec


 


ABG pCO2  26 L   (35-45)  mmHg


 


ABG pO2  247 H   ()  mmHg


 


ABG HCO3  15 L   (21-25)  mmol/L


 


ABG Total CO2  16 L   (19-24)  mmol/L


 


ABG O2 Saturation  99.6 H   (94-97)  %


 


Sodium    (137-145)  mmol/L


 


Chloride    ()  mmol/L


 


Carbon Dioxide    (22-30)  mmol/L


 


BUN    (9-20)  mg/dL


 


Creatinine    (0.66-1.25)  mg/dL


 


Glucose    (74-99)  mg/dL


 


POC Glucose (mg/dL)   145 H  (75-99)  mg/dL


 


Calcium    (8.4-10.2)  mg/dL


 


Magnesium    (1.6-2.3)  mg/dL














Assessment and Plan


(1) Acute transmural inferior wall MI


Current Visit: Yes   Status: Acute   Code(s): I21.19 - STEMI INVOLVING OTH 

CORONARY ARTERY OF INFERIOR WALL   SNOMED Code(s): 87662474


   





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


Current Visit: Yes   Status: Acute   Code(s): I50.41 - ACUTE COMBINED SYSTOLIC 

AND DIASTOLIC (CONGESTIVE) HRT FAIL   SNOMED Code(s): 139452404338740


   





(3) Essential hypertension


Current Visit: Yes   Status: Acute   Code(s): I10 - ESSENTIAL (PRIMARY) 

HYPERTENSION   SNOMED Code(s): 71415809


   





(4) Cardiac arrest


Current Visit: Yes   Status: Acute   Code(s): I46.9 - CARDIAC ARREST, CAUSE 

UNSPECIFIED   SNOMED Code(s): 626912121


   


Plan: 


Patient had severe bradycardia sinus pauses and cardiac arrest requiring 

resuscitation.  Subsequently, patient had temporary pacemaker.  Still having 

intermittent atrial fibrillation.  Urine output output is low and patient has 

been hypotensive.  Discussed with family about CODE STATUS and extent of the 

care.  The going to discuss and decide.  Meanwhile they did consent for 

temporary pacemaker which was implanted.  Further recommendations depend upon 

clinical course.  Prognosis is guarded

## 2020-07-07 NOTE — P.PCN
Date of Procedure: 07/07/20


Preoperative Diagnosis: 


Bradycardia cardiac arrest and intermittent atrial fibrillation


Postoperative Diagnosis: 


The same


Procedure(s) Performed: 


Temporary pacemaker implantation


Description of Procedure: 


This 79-year-old gentleman was admitted to the hospital with inferior wall MI 

and had stent placement of the RCA.  Patient also has peripheral vascular 

disease and had some difficulties advancing the wire because of iliac disease.  

Patient has developed CHF.  Renal failure and intermittent atrial fibrillation. 

This morning patient went bradycardic and had a cardiac arrest.  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 

tachybradycardia syndrome, patient is advised to have temporary pacemaker 

implantation.





Procedure: Patient was brought to the lab in intubated status and sedated.  The 

right groin is infiltrated with lidocaine.  Right femoral vein was entered and a

6-Yi sheath was placed in the vein.  A 5-Yi balloontipped temporary 

pacemaker was advanced to the apex of the right ventricle.  A satisfactory 

position was obtained.  Threshold were measured.  The minimum patient threshold 

was 0.3.  The pacemaker is set at a rate of 50 and output of 3.  No immediate 

complications.





Plan: Patient was transferred to intensive care unit.  Continue to monitor

## 2020-07-07 NOTE — P.PN
Subjective





79 years old male with past medical history of hypertension, hyperlipidemia, 

dementia, prostate cancer status post radiotherapy , patient presents because of

chest pain found to have STEMI, he underwent cardiac cath status post stent 

placement in the right coronary artery.  He is in the ICU.


  His creatinine increased today to 1.4, WBC is high at 18.6 and 19.6 K, sodium 

131, his chest x-ray showing pulmonary congestion with possible interstitial 

pneumonia


He saturating 90s on 7 L/m oxygen via nasal cannula.


Cardiologist already given 1 dose of Lasix IV and continued on 40 mg by mouth 

daily, 1 going to give him another dose of Lasix.  Blood pressure is on the low 

normal but he is tachypneic at 35.  A but that side nurse his breathing is li

ttle better in the evening than in the morning and he did not need BiPAP for 

now.


He is also on aspirin and Plavix, he is on metoprolol 12.5 mg and lisinopril 2.5

mg, we going to hold lisinopril for now


Also call pulmonary and nephrology consult





07/04/2020


Patient breathing easier but however his significantly tachypneic and mid 20s, 

he saturating 94% on 9 L oxygen via nasal cannula.  His blood pressure 96/57 and

heart rate 77.


He still bleeding from dyspnea but no chest pain, he has little cough with clear

phlegm.


This morning he was trying to get out of bed to the restroom by himself when he 

fell on hit his head in the forehead area.


Chest x-ray: Pulmonary edema, atypical pneumonia, some right lower lobe 

pneumonia


WBC this morning is 18.1 K with slight improvement.  Sodium 132, creatinine 

stable at 1.5.  Procol stone and is elevated at 0.23


Patient is currently on by mouth Lasix, lisinopril is held, cardiologist 

recommended to continue with metoprolol 12.5, we going to check with cardiology 

about holding parameters.  Continue with Levaquin.


Ordered EKG








7/5/20


Patient awake and alert, his breathing is easier although he still complains 

from some chest pain with little dyspnea and the 10 cough.


He is saturating 94% on 3-5 L of oxygen via nasal cannula.  Rest of Vitas looks 

stable, however her blood pressure is on the low side, this morning was 78/56-

94/59 


WBC is 17.1 K, hemoglobin 12.2, sodium 128, creatinine went up to 2.3.  Lactic 

acid is elevated at 3.8.  ProBNP is 41 500 


sputum culture is pending.  Chest x-ray showing stable changes with diffuse 

parenchymal changes or diffuse pneumonia or CHF/pulmonary edema


Cardiology started the patient on dobutamine drip, also he got 1 dose of Lasix 

60 mg IV 1 and on sodium bicarbonate tablets.


Patient also is on Levaquin 250 mg by mouth daily, which is renal dose.  

Pulmonary nephrology and cardiology teams on the case





07/06/2020


Patient is still complaining of shortness of breath patient has significant 

rhonchi streaking heart failure exacerbation patient is presently on dobutamine 

drip does have atrial fibrillation for which patient is on Cardizem, patient is 

still hyponatremic receiving sodium bicarbonate tablets is also on Lasix, 

patient is on metoprolol as well.  Patient was started on IV heparin.  We will d

yspnea Cardizem as it decreases as it [facility] heart failure.  

Nephrology is following the patient patient probably has hypovolemic 

hyponatremia





007/07/2020


Patient went into asystole after which the patient had a temporary transvenous a

speckled placement , patient was also intubated because of respiratory failure. 

Patient is presently on dopamine drip and also on norepinephrine drip.  Patient 

is presently sinus rhythm.  Patient still has pulmonary edema on the chest x-ray

nephrology is managing diuretics patient serum sodium continues to be low at 

124we creatinine continued to go up to subacute 2.79





review of systems: Unable to obtain as patient is intubated and sedated at this 

time





All inpatient medications were reviewed and appropriate changes in these 

medications as dictated in the interval history and assessment and plan.








Objective





- Vital Signs


Vital signs: 


                                   Vital Signs











Temp  97.4 F L  07/07/20 12:00


 


Pulse  67   07/07/20 15:00


 


Resp  14   07/07/20 15:00


 


BP  83/60   07/07/20 15:00


 


Pulse Ox  97   07/07/20 15:00








                                 Intake & Output











 07/06/20 07/07/20 07/07/20





 18:59 06:59 18:59


 


Intake Total 597.18 237.9 1898.244


 


Output Total 365 455 180


 


Balance 232.18 -217.1 1718.244


 


Weight  74.4 kg 78.5 kg


 


Intake:   


 


   237.9 312.7


 


    0.9 120 120 90


 


    0.9NS Sheath   80


 


    Amiodarone 300 mg In   75





    Dextrose 5% in Water 250   





    ml @ 0.5 MG/MIN 25 mls/hr   





    IV .Q10H [facility] Rx#:   





    [id number]   


 


    Amiodarone 360 mg In  99.9 33.3





    Dextrose 5% in Water 200   





    ml @ 1 MG/MIN 33.333 mls/   





    hr IV .Q6H [facility] Rx#:   





    [id number]   


 


    DOBUTamine DRIP 500 mg In  18 34.4





    Dextrose/Water 1 250ml.   





    bag @ 2.5 MCG/KG/MIN 5.94   





    mls/hr IV .Q24H [facility] Rx#:   





    [id number]   


 


  Intake, IV Titration 357.18  1525.544





  Amount   


 


    Amiodarone 300 mg In   184.167





    Dextrose 5% in Water 250   





    ml @ 0.5 MG/MIN 25 mls/hr   





    IV .Q10H [facility] Rx#:   





    [id number]   


 


    DOBUTamine DRIP 500 mg In 232.18  91.377





    Dextrose/Water 1 250ml.   





    bag @ 2.5 MCG/KG/MIN 5.94   





    mls/hr IV .Q24H [facility] Rx#:   





    [id number]   


 


    Diltiazem 125 mg In 125.00  





    Sodium Chloride 0.9% 100   





    ml @ 7.5 MG/HR 7.5 mls/hr   





    IV .T16H87O [facility] Rx#:   





    [id number]   


 


    Heparin Sod,Pork in 0.45%   250.000





    NaCl 25,000 unit In 0.45   





    % NaCl 1 250ml.bag @ 12.5   





    UNITS/KG/HR 9.95 mls/hr   





    IV .Q24H [facility] Rx#:   





    [id number]   


 


    Sodium Chloride 0.9% 1,   1000





    000 ml @ 999 mls/hr IV .   





    Q1H1M [facility] Rx#:[id number]   


 


  Oral 120  60


 


Output:   


 


  Urine 365 455 180


 


Other:   


 


  Voiding Method Bedside Commode Indwelling Catheter Indwelling Catheter


 


  # Voids 0  


 


  # Bowel Movements 2  














- Exam


GENERAL:patient is intubated sedated


HEENT: Pupils are round and equally reacting to light. EOMI. No scleral icterus.

No conjunctival pallor. Normocephalic, atraumatic. No pharyngeal erythema. No 

thyromegaly. 


CARDIOVASCULAR: S1 and S2 present. No murmurs, rubs, or gallops.  Does have 

elevated JVD


-PULMONARY: Diffuse bilateral crackles


ABDOMEN: Soft, nontender, nondistended, normoactive bowel sounds. No palpable 

organomegaly. 


MUSCULOSKELETAL: No joint swelling or deformity. 


EXTREMITIES: No cyanosis, clubbing, or pedal edema. 


NEUROLOGICAL: unable to assess. 


SKIN: No rashes. no petechiae.








- Labs


CBC & Chem 7: 


                                 07/07/20 04:40





                                 07/07/20 04:51


Labs: 


                  Abnormal Lab Results - Last 24 Hours (Table)











  07/06/20 07/06/20 07/07/20 Range/Units





  17:08 22:54 04:40 


 


WBC    16.6 H  (3.8-10.6)  k/uL


 


RBC    3.33 L  (4.30-5.90)  m/uL


 


Hgb    11.2 L  (13.0-17.5)  gm/dL


 


Hct    33.4 L  (39.0-53.0)  %


 


MCV    100.4 H  (80.0-100.0)  fL


 


Neutrophils #    13.9 H  (1.3-7.7)  k/uL


 


Monocytes #    1.2 H  (0-1.0)  k/uL


 


APTT  58.3 H    (22.0-30.0)  sec


 


ABG pCO2     (35-45)  mmHg


 


ABG pO2     ()  mmHg


 


ABG HCO3     (21-25)  mmol/L


 


ABG Total CO2     (19-24)  mmol/L


 


ABG O2 Saturation     (94-97)  %


 


Sodium   124 L   (137-145)  mmol/L


 


Chloride   92 L   ()  mmol/L


 


Carbon Dioxide   19 L   (22-30)  mmol/L


 


BUN   89 H   (9-20)  mg/dL


 


Creatinine   2.79 H   (0.66-1.25)  mg/dL


 


Glucose   118 H   (74-99)  mg/dL


 


POC Glucose (mg/dL)     (75-99)  mg/dL


 


Calcium   7.9 L   (8.4-10.2)  mg/dL


 


Magnesium   3.0 H   (1.6-2.3)  mg/dL














  07/07/20 07/07/20 07/07/20 Range/Units





  04:40 04:51 04:51 


 


WBC     (3.8-10.6)  k/uL


 


RBC     (4.30-5.90)  m/uL


 


Hgb     (13.0-17.5)  gm/dL


 


Hct     (39.0-53.0)  %


 


MCV     (80.0-100.0)  fL


 


Neutrophils #     (1.3-7.7)  k/uL


 


Monocytes #     (0-1.0)  k/uL


 


APTT  60.6 H    (22.0-30.0)  sec


 


ABG pCO2     (35-45)  mmHg


 


ABG pO2     ()  mmHg


 


ABG HCO3     (21-25)  mmol/L


 


ABG Total CO2     (19-24)  mmol/L


 


ABG O2 Saturation     (94-97)  %


 


Sodium   127 L   (137-145)  mmol/L


 


Chloride   93 L   ()  mmol/L


 


Carbon Dioxide   20 L   (22-30)  mmol/L


 


BUN   96 H   (9-20)  mg/dL


 


Creatinine   2.69 H   (0.66-1.25)  mg/dL


 


Glucose   154 H   (74-99)  mg/dL


 


POC Glucose (mg/dL)     (75-99)  mg/dL


 


Calcium   7.6 L   (8.4-10.2)  mg/dL


 


Magnesium    3.2 H  (1.6-2.3)  mg/dL














  07/07/20 07/07/20 Range/Units





  12:19 13:50 


 


WBC    (3.8-10.6)  k/uL


 


RBC    (4.30-5.90)  m/uL


 


Hgb    (13.0-17.5)  gm/dL


 


Hct    (39.0-53.0)  %


 


MCV    (80.0-100.0)  fL


 


Neutrophils #    (1.3-7.7)  k/uL


 


Monocytes #    (0-1.0)  k/uL


 


APTT    (22.0-30.0)  sec


 


ABG pCO2  26 L   (35-45)  mmHg


 


ABG pO2  247 H   ()  mmHg


 


ABG HCO3  15 L   (21-25)  mmol/L


 


ABG Total CO2  16 L   (19-24)  mmol/L


 


ABG O2 Saturation  99.6 H   (94-97)  %


 


Sodium    (137-145)  mmol/L


 


Chloride    ()  mmol/L


 


Carbon Dioxide    (22-30)  mmol/L


 


BUN    (9-20)  mg/dL


 


Creatinine    (0.66-1.25)  mg/dL


 


Glucose    (74-99)  mg/dL


 


POC Glucose (mg/dL)   145 H  (75-99)  mg/dL


 


Calcium    (8.4-10.2)  mg/dL


 


Magnesium    (1.6-2.3)  mg/dL














Assessment and Plan


Plan: 


-STEMI status post cardiac cath and stent placement in the RCA


Acute hypoxic respiratory failure secondary to congestive heart failure because 

systolic dysfunction with the acute exacerbation, patient is presently intubated

patient was intubated on 07/07/2020


-Asystole, complete heart block patient has a temporary venous pacemaker


-Cardiac shock: Continue with the dobutamine,patient is also on Levophed drip


-New-onset atrial fibrillation continue with heparin continue with beta blocker 

patient was started on amiodarone if he goes back into A. fib,


Acute kidney injury prerenal azotemia secondary to heart failure exacerbation


-Hypervolemic hyponatremia secondary to heart failure


Hypotension


Fall, with no dizziness or syncope


-Patient is on levofloxacin because of possibly of pneumonia


Hypertension


Hyperlipidemia


History of prostate cancer status post radiotherapy

## 2020-07-07 NOTE — XR
EXAMINATION TYPE: XR chest 1V portable

 

DATE OF EXAM: 7/7/2020

 

COMPARISON: 7/7/2020 earlier exam

 

INDICATION: Tube placement

 

TECHNIQUE: Single frontal view of the chest is obtained.

 

FINDINGS:  

The heart size is enlarged.  

The pulmonary vasculature is prominent.  

There is increased opacity through the left perihilar region. Right lower lobe infiltrate remains pre
sent. Small to moderate right and small left pleural effusion are stable

 

There is placement of an endotracheal tube with the tip 3.1 cm above the giovanna. Nasogastric tube tra
nsverses the thorax  

 

 

IMPRESSION:  

1. Developing right perihilar infiltrate.

2. Small to moderate right and small left pleural effusions, stable.

3. Posterior medial right lower lobe infiltrate.

4. Lines and catheters discussed above.

## 2020-07-07 NOTE — XR
EXAMINATION TYPE: XR chest 1V portable

 

DATE OF EXAM: 7/7/2020

 

COMPARISON: 7/6/2020

 

INDICATION: CHF

 

TECHNIQUE: Single frontal view of the chest is obtained.

 

FINDINGS:  

The heart size is normal.  

The pulmonary vasculature is prominent.  

There is diffuse increased lung markings. Small bilateral pleural effusions are present, larger on th
e right. Right lower lobe infiltrate can be related to atelectasis or pneumonia.  

 

 

IMPRESSION:  

1. Bilateral pleural effusions, greater on the right. Some adjacent right lower lobe infiltrate is pr
esent. Atelectasis or pneumonia could be considered.

2. Prominent pulmonary vascular markings. Pulmonary edema may be present. Clinical correlation recomm
ended.

## 2020-07-07 NOTE — PN
PROGRESS NOTE



PULMONARY/CRITICAL CARE PROGRESS NOTE:



DATE OF SERVICE:

07/07/2020



Critical care time 32 minutes.



A 79-year-old male admitted on July 2 for ST-segment elevation myocardial infarction.

He had a stent placed in his right coronary artery.  He was admitted with a diagnosis

of a heart failure, hypoxemia, and atrial fibrillation.  Currently, the patient is on

O2 at 3 L by nasal cannula.  He was receiving dobutamine at 2.5 mcg/kg per minute and

also because of her current atrial fibrillation, was placed on amiodarone at 0.5

mg/minutes and IV heparin via weight based protocol.  Previously, he was on a Cardizem

drip.  Currently, the patient is resting comfortably.  Denies any shortness of breath

or chest pain.  Denies any fever or chills.  There is no nausea, vomiting, diarrhea, or

abdominal discomfort.



Current vital signs are reviewed, temperature is 97.6, heart rate 79, respiratory rate

20, blood pressure 94/52 mean 66, saturations are 94% on 3 L.  Appears in no acute

distress.

HEENT: Examination is grossly unremarkable.  Nasal O2 in place.

NECK:  Supple.  Full range of motion.  No adenopathy.  Neck veins are flat.

CARDIOVASCULAR: Examination reveals regular rhythm and rate.  Heart rate about 90 beats

per minute.  S1, S2 normal.  No murmur.

LUNGS:  Reveal mostly clear breath sounds.  A few scattered crackles.  No wheezes or

rhonchi.  Breath sounds equal.

ABDOMEN:  Soft, bowel sounds are heard.

EXTREMITIES:  Intact.  No edema.

SKIN: Without rash.

NEUROLOGIC: Examination is nonfocal.



LABS:

Reviewed.  White count 16.6, hemoglobin 11.2, hematocrit 33.4, platelet count 329,000,

PTT is 60.6.  Sodium 127, potassium 4, chloride 93, CO2 is 20, anion gap is 14. BUN and

creatinine were 96 and 2.69 compared to 89 and 2.79 yesterday.  Calcium 7.6.



Microbiology is negative.



Chest x-ray from this morning shows borderline cardiomegaly.  Some bibasilar

infiltrates or atelectasis and a right-sided pleural effusion.



Current medications are reviewed.



ASSESSMENT:

1. Acute inferior wall ST-segment elevation myocardial infarction, status post

    stenting of the right coronary artery.

2. Acute pulmonary edema secondary to acute systolic congestive heart failure and

    ischemic cardiomyopathy.

3. Benign essential hypertension.

4. Hyperlipidemia.

5. History of mild dementia.

6. Atrial fibrillation with RVR.

7. History of acute kidney injury.

8. Possible right lower lobe pneumonia.



PLAN:

Currently, the patient is on 3 L nasal cannula.  That is better in the way of his

oxygenation.  Chest x-ray is similar to the one from yesterday.  It does show a right-

sided effusion or infiltrate.  The patient remains on dobutamine at 2.5 mcg/kg per

minute.  Yesterday he was on Cardizem.  It has been switched to amiodarone at 0.5

mg/minute.  The patient is also receiving heparin via weight-based protocol.

Additional recommendations and suggestions are forthcoming.  Prognosis is guarded.

Will continue to follow.



Microbiology is thus far negative.





MMODL / IJN: [id number] / Job#: [id number]

## 2020-07-08 LAB
ALBUMIN SERPL-MCNC: 2.9 G/DL (ref 3.5–5)
ALP SERPL-CCNC: 85 U/L (ref 38–126)
ALT SERPL-CCNC: 867 U/L (ref 4–49)
ANION GAP SERPL CALC-SCNC: 12 MMOL/L
AST SERPL-CCNC: 1225 U/L (ref 17–59)
BASOPHILS # BLD AUTO: 0 K/UL (ref 0–0.2)
BASOPHILS NFR BLD AUTO: 0 %
BUN SERPL-SCNC: 99 MG/DL (ref 9–20)
CALCIUM SPEC-MCNC: 7.3 MG/DL (ref 8.4–10.2)
CHLORIDE SERPL-SCNC: 96 MMOL/L (ref 98–107)
CO2 BLDA-SCNC: 18 MMOL/L (ref 19–24)
CO2 SERPL-SCNC: 18 MMOL/L (ref 22–30)
EOSINOPHIL # BLD AUTO: 0.1 K/UL (ref 0–0.7)
EOSINOPHIL NFR BLD AUTO: 1 %
ERYTHROCYTE [DISTWIDTH] IN BLOOD BY AUTOMATED COUNT: 3.4 M/UL (ref 4.3–5.9)
ERYTHROCYTE [DISTWIDTH] IN BLOOD: 13.1 % (ref 11.5–15.5)
GLUCOSE BLD-MCNC: 184 MG/DL (ref 75–99)
GLUCOSE BLD-MCNC: 193 MG/DL (ref 75–99)
GLUCOSE SERPL-MCNC: 130 MG/DL (ref 74–99)
HCO3 BLDA-SCNC: 18 MMOL/L (ref 21–25)
HCT VFR BLD AUTO: 34.1 % (ref 39–53)
HGB BLD-MCNC: 11 GM/DL (ref 13–17.5)
LYMPHOCYTES # SPEC AUTO: 1 K/UL (ref 1–4.8)
LYMPHOCYTES NFR SPEC AUTO: 6 %
MAGNESIUM SPEC-SCNC: 3.3 MG/DL (ref 1.6–2.3)
MCH RBC QN AUTO: 32.3 PG (ref 25–35)
MCHC RBC AUTO-ENTMCNC: 32.3 G/DL (ref 31–37)
MCV RBC AUTO: 100 FL (ref 80–100)
MONOCYTES # BLD AUTO: 1 K/UL (ref 0–1)
MONOCYTES NFR BLD AUTO: 6 %
NEUTROPHILS # BLD AUTO: 14.1 K/UL (ref 1.3–7.7)
NEUTROPHILS NFR BLD AUTO: 85 %
PCO2 BLDA: 26 MMHG (ref 35–45)
PH BLDA: 7.43 [PH] (ref 7.35–7.45)
PLATELET # BLD AUTO: 364 K/UL (ref 150–450)
PO2 BLDA: 86 MMHG (ref 83–108)
POTASSIUM SERPL-SCNC: 3.9 MMOL/L (ref 3.5–5.1)
PROT SERPL-MCNC: 5.1 G/DL (ref 6.3–8.2)
SODIUM SERPL-SCNC: 126 MMOL/L (ref 137–145)
WBC # BLD AUTO: 16.6 K/UL (ref 3.8–10.6)

## 2020-07-08 PROCEDURE — 04HY32Z INSERTION OF MONITORING DEVICE INTO LOWER ARTERY, PERCUTANEOUS APPROACH: ICD-10-PCS

## 2020-07-08 PROCEDURE — 5A12012 PERFORMANCE OF CARDIAC OUTPUT, SINGLE, MANUAL: ICD-10-PCS

## 2020-07-08 PROCEDURE — 02HV33Z INSERTION OF INFUSION DEVICE INTO SUPERIOR VENA CAVA, PERCUTANEOUS APPROACH: ICD-10-PCS

## 2020-07-08 RX ADMIN — METOPROLOL TARTRATE SCH MG: 25 TABLET, FILM COATED ORAL at 08:16

## 2020-07-08 RX ADMIN — PROPOFOL SCH MLS/HR: 10 INJECTION, EMULSION INTRAVENOUS at 06:57

## 2020-07-08 RX ADMIN — INSULIN ASPART SCH UNIT: 100 INJECTION, SOLUTION INTRAVENOUS; SUBCUTANEOUS at 12:55

## 2020-07-08 RX ADMIN — NOREPINEPHRINE BITARTRATE SCH MLS/HR: 1 INJECTION, SOLUTION, CONCENTRATE INTRAVENOUS at 03:04

## 2020-07-08 RX ADMIN — NOREPINEPHRINE BITARTRATE SCH MLS/HR: 1 INJECTION, SOLUTION, CONCENTRATE INTRAVENOUS at 15:19

## 2020-07-08 RX ADMIN — PROPOFOL SCH MLS/HR: 10 INJECTION, EMULSION INTRAVENOUS at 12:42

## 2020-07-08 RX ADMIN — TAMSULOSIN HYDROCHLORIDE SCH MG: 0.4 CAPSULE ORAL at 08:16

## 2020-07-08 RX ADMIN — Medication SCH: at 18:36

## 2020-07-08 RX ADMIN — CHLORHEXIDINE GLUCONATE SCH ML: 1.2 RINSE ORAL at 08:16

## 2020-07-08 RX ADMIN — PANTOPRAZOLE SODIUM SCH MG: 40 INJECTION, POWDER, FOR SOLUTION INTRAVENOUS at 08:15

## 2020-07-08 RX ADMIN — CLOPIDOGREL BISULFATE SCH MG: 75 TABLET ORAL at 08:16

## 2020-07-08 RX ADMIN — HEPARIN SODIUM SCH MLS/HR: 10000 INJECTION, SOLUTION INTRAVENOUS at 15:22

## 2020-07-08 RX ADMIN — Medication SCH MG: at 08:16

## 2020-07-08 RX ADMIN — PROPOFOL SCH MLS/HR: 10 INJECTION, EMULSION INTRAVENOUS at 03:04

## 2020-07-08 RX ADMIN — INSULIN ASPART SCH: 100 INJECTION, SOLUTION INTRAVENOUS; SUBCUTANEOUS at 05:51

## 2020-07-08 RX ADMIN — MORPHINE SULFATE PRN MG: 2 INJECTION, SOLUTION INTRAMUSCULAR; INTRAVENOUS at 16:41

## 2020-07-08 RX ADMIN — INSULIN ASPART SCH UNIT: 100 INJECTION, SOLUTION INTRAVENOUS; SUBCUTANEOUS at 00:05

## 2020-07-08 NOTE — PCN
PROCEDURE NOTE



PROCEDURE:

Left subclavian triple-lumen catheter.



PREOPERATIVE DIAGNOSIS:

Administration of fluids and pressors.



POSTOPERATIVE DIAGNOSIS:

Administration of fluids and pressors.



There was informed consent.



A time-out was completed verifying correct patient, procedure, site, positioning, and

implant(s) or special equipment if applicable.



The patient was placed in a dependent position appropriate for triple lumen catheter

placement based on the vein to be cannulated.  The patient's left shoulder was prepped

and draped in sterile fashion.  1% Lidocaine was used to anesthetize the surrounding

skin area.  A triple lumen 9F Cordis catheter was introduced into the left subclavian

using Seldinger technique.  The catheter was threaded smoothly over the guide wire and

appropriate blood return was obtained.  Each lumen of the catheter was evacuated of air

and flushed with sterile saline.  The catheter was then sutured in place to the skin

and a sterile dressing applied.  Perfusion to the extremity distal to the point of

catheter insertion was checked and found to be adequate.



There was no immediate complication there was good blood return from all 3 ports.  The

catheter was seen at the junction of the superior vena cava, right atrium.  The

catheter was sutured in place.  Sterile dressing was applied by the nurse.  A chest x-

ray was ordered to check placement and rule out pneumothorax.  The patient tolerated

the procedure well without complication.





MMODL / IJN: [id number] / Job#: [id number]

## 2020-07-08 NOTE — XR
EXAMINATION TYPE: XR chest 1V portable

 

DATE OF EXAM: 7/8/2020

 

COMPARISON: Prior chest x-ray 7/7/2020

 

HISTORY: Intubated

 

TECHNIQUE: Single frontal view of the chest is obtained.

 

FINDINGS:  Endotracheal tube and NG tube are overlying appropriate positions. There are overlying pamela
ds present, transvenous pacemaker coursing from the inferior margin of the exam is present in the dis
elliott tip overlying the right ventricle. The heart is enlarged. Bilateral airspace disease persists, th
e hemidiaphragms are obscured. No evident pneumothorax. Aorta is dense.

 

IMPRESSION:  Correlate for congestive heart failure with pulmonary edema, pneumonia, ARDS within the 
differential.

## 2020-07-08 NOTE — XR
EXAMINATION TYPE: XR chest 1V portable

 

DATE OF EXAM: 7/8/2020

 

COMPARISON: Prior chest x-ray 7/8/2020

Time

 

HISTORY: Intubated, central venous catheter placement

 

TECHNIQUE: Single frontal view of the chest is obtained.

 

FINDINGS:  Findings are similar to prior exam. There may be some slight interval improved aeration at
 the upper lobes. Endotracheal and NG tube, transvenous pacemaker are again noted. Heart remains enla
rged. Bibasilar increased density, diffuse airspace disease persists. Left subclavian central venous 
catheter is been placed in the interval. Distal tip is overlying the cavoatrial junction. No pneumoth
orax.

 

IMPRESSION:  No evident complication status post central venous catheter placement.

## 2020-07-08 NOTE — P.PN
Subjective





79 years old male with past medical history of hypertension, hyperlipidemia, 

dementia, prostate cancer status post radiotherapy , patient presents because of

chest pain found to have STEMI, he underwent cardiac cath status post stent 

placement in the right coronary artery.  He is in the ICU.


  His creatinine increased today to 1.4, WBC is high at 18.6 and 19.6 K, sodium 

131, his chest x-ray showing pulmonary congestion with possible interstitial 

pneumonia


He saturating 90s on 7 L/m oxygen via nasal cannula.


Cardiologist already given 1 dose of Lasix IV and continued on 40 mg by mouth 

daily, 1 going to give him another dose of Lasix.  Blood pressure is on the low 

normal but he is tachypneic at 35.  A but that side nurse his breathing is li

ttle better in the evening than in the morning and he did not need BiPAP for 

now.


He is also on aspirin and Plavix, he is on metoprolol 12.5 mg and lisinopril 2.5

mg, we going to hold lisinopril for now


Also call pulmonary and nephrology consult





07/04/2020


Patient breathing easier but however his significantly tachypneic and mid 20s, 

he saturating 94% on 9 L oxygen via nasal cannula.  His blood pressure 96/57 and

heart rate 77.


He still bleeding from dyspnea but no chest pain, he has little cough with clear

phlegm.


This morning he was trying to get out of bed to the restroom by himself when he 

fell on hit his head in the forehead area.


Chest x-ray: Pulmonary edema, atypical pneumonia, some right lower lobe 

pneumonia


WBC this morning is 18.1 K with slight improvement.  Sodium 132, creatinine 

stable at 1.5.  Procol stone and is elevated at 0.23


Patient is currently on by mouth Lasix, lisinopril is held, cardiologist 

recommended to continue with metoprolol 12.5, we going to check with cardiology 

about holding parameters.  Continue with Levaquin.


Ordered EKG








7/5/20


Patient awake and alert, his breathing is easier although he still complains 

from some chest pain with little dyspnea and the 10 cough.


He is saturating 94% on 3-5 L of oxygen via nasal cannula.  Rest of Vitas looks 

stable, however her blood pressure is on the low side, this morning was 78/56-

94/59 


WBC is 17.1 K, hemoglobin 12.2, sodium 128, creatinine went up to 2.3.  Lactic 

acid is elevated at 3.8.  ProBNP is 41 500 


sputum culture is pending.  Chest x-ray showing stable changes with diffuse 

parenchymal changes or diffuse pneumonia or CHF/pulmonary edema


Cardiology started the patient on dobutamine drip, also he got 1 dose of Lasix 

60 mg IV 1 and on sodium bicarbonate tablets.


Patient also is on Levaquin 250 mg by mouth daily, which is renal dose.  

Pulmonary nephrology and cardiology teams on the case





07/06/2020


Patient is still complaining of shortness of breath patient has significant 

rhonchi streaking heart failure exacerbation patient is presently on dobutamine 

drip does have atrial fibrillation for which patient is on Cardizem, patient is 

still hyponatremic receiving sodium bicarbonate tablets is also on Lasix, 

patient is on metoprolol as well.  Patient was started on IV heparin.  We will d

yspnea Cardizem as it decreases as it [facility] heart failure.  

Nephrology is following the patient patient probably has hypovolemic 

hyponatremia





007/07/2020


Patient went into asystole after which the patient had a temporary transvenous a

speckled placement , patient was also intubated because of respiratory failure. 

Patient is presently on dopamine drip and also on norepinephrine drip.  Patient 

is presently sinus rhythm.  Patient still has pulmonary edema on the chest x-ray

nephrology is managing diuretics patient serum sodium continues to be low at 

124we creatinine continued to go up to subacute 2.79





July 2020


Patient remains on ventilator support with FiO2 of 50% deep of 5 respiratory set

up respiratory rate of 12.  Blood gases did not show any significant 

abnormality.  Patient remains are not epinephrine 8 mcg/m dobutamine drip 

propofol drip heparin drip presently sinus rhythm, nutrition via NG tube





review of systems: Unable to obtain as patient is intubated and sedated at this 

time





All inpatient medications were reviewed and appropriate changes in these 

medications as dictated in the interval history and assessment and plan.








Objective





- Vital Signs


Vital signs: 


                                   Vital Signs











Temp  97.8 F   07/08/20 12:00


 


Pulse  102 H  07/08/20 13:00


 


Resp  16   07/08/20 13:00


 


BP  124/82   07/08/20 13:00


 


Pulse Ox  96   07/08/20 13:00








                                 Intake & Output











 07/07/20 07/08/20 07/08/20





 18:59 06:59 18:59


 


Intake Total 2099.807 1433.692 484.000


 


Output Total 295 590 60


 


Balance 1804.807 843.692 424.000


 


Weight 78.5 kg 88.1 kg 


 


Intake:   


 


  .7 470 40


 


    0.9 120 230 20


 


    0.9NS Sheath 140 240 20


 


    Amiodarone 300 mg In 75  





    Dextrose 5% in Water 250   





    ml @ 0.5 MG/MIN 25 mls/hr   





    IV .Q10H [facility] Rx#:   





    [id number]   


 


    Amiodarone 360 mg In 33.3  





    Dextrose 5% in Water 200   





    ml @ 1 MG/MIN 33.333 mls/   





    hr IV .Q6H ONE Rx#:   





    [id number]   


 


    DOBUTamine DRIP 500 mg In 34.4  





    Dextrose/Water 1 250ml.   





    bag @ 5 MCG/KG/MIN 11.88   





    mls/hr IV .Q21H3M [facility] Rx#   





    [id number]   


 


  Intake, IV Titration 1567.107 693.692 354.000





  Amount   


 


    Amiodarone 300 mg In 184.167  





    Dextrose 5% in Water 250   





    ml @ 0.5 MG/MIN 25 mls/hr   





    IV .Q10H [facility] Rx#:   





    [id number]   


 


    DOBUTamine DRIP 500 mg In 91.377 109.89 





    Dextrose/Water 1 250ml.   





    bag @ 5 MCG/KG/MIN 11.88   





    mls/hr IV .Q21H3M [facility] Rx#   





    [id number]   


 


    Heparin Sod,Pork in 0.45% 250.000 155 





    NaCl 25,000 unit In 0.45   





    % NaCl 1 250ml.bag @ 12.5   





    UNITS/KG/HR 9.95 mls/hr   





    IV .Q24H [facility]IL[facility] Rx#:   





    [id number]   


 


    Norepinephrine 4 mg In 8.976 168.713 254.000





    Sodium Chloride 0.9% 250   





    ml @ 0.05 MCG/KG/MIN 14.   





    173 mls/hr IV .[id number]   





    KILO Rx#:[id number]   


 


    Propofol 1,000 mg In 32.587 260.089 100





    Empty Bag 1 bag @ Titrate   





    IV .Q0M [facility]IL[facility] Rx#:   





    [id number]   


 


    Sodium Chloride 0.9% 1, 1000  





    000 ml @ 999 mls/hr IV .   





    Q1H1M ONE Rx#:[id number]   


 


  Oral 60  


 


  Tube Feeding 40 180 90


 


  Other 30 90 


 


Output:   


 


  Urine 295 590 60


 


Other:   


 


  Voiding Method Indwelling Catheter Indwelling Catheter Indwelling Catheter








                       ABP, PAP, CO, CI - Last Documented











Arterial Blood Pressure        113/67

















- Exam


GENERAL:patient is intubated sedated


HEENT: Pupils are round and equally reacting to light. EOMI. No scleral icterus.

No conjunctival pallor. Normocephalic, atraumatic. No pharyngeal erythema. No 

thyromegaly. 


CARDIOVASCULAR: S1 and S2 present. No murmurs, rubs, or gallops.  Does have 

elevated JVD


-PULMONARY: Diffuse bilateral crackles


ABDOMEN: Soft, nontender, nondistended, normoactive bowel sounds. No palpable 

organomegaly. 


MUSCULOSKELETAL: No joint swelling or deformity. 


EXTREMITIES: No cyanosis, clubbing, or pedal edema. 


NEUROLOGICAL: unable to assess. 


SKIN: No rashes. no petechiae.








- Labs


CBC & Chem 7: 


                                 07/08/20 04:26





                                 07/08/20 04:26


Labs: 


                  Abnormal Lab Results - Last 24 Hours (Table)











  07/07/20 07/07/20 07/07/20 Range/Units





  13:50 18:21 23:41 


 


WBC     (3.8-10.6)  k/uL


 


RBC     (4.30-5.90)  m/uL


 


Hgb     (13.0-17.5)  gm/dL


 


Hct     (39.0-53.0)  %


 


Neutrophils #     (1.3-7.7)  k/uL


 


ABG pCO2     (35-45)  mmHg


 


ABG HCO3     (21-25)  mmol/L


 


ABG Total CO2     (19-24)  mmol/L


 


Sodium     (137-145)  mmol/L


 


Chloride     ()  mmol/L


 


Carbon Dioxide     (22-30)  mmol/L


 


BUN     (9-20)  mg/dL


 


Creatinine     (0.66-1.25)  mg/dL


 


Glucose     (74-99)  mg/dL


 


POC Glucose (mg/dL)  145 H  130 H  150 H  (75-99)  mg/dL


 


Calcium     (8.4-10.2)  mg/dL


 


Magnesium     (1.6-2.3)  mg/dL


 


AST     (17-59)  U/L


 


ALT     (4-49)  U/L


 


Total Protein     (6.3-8.2)  g/dL


 


Albumin     (3.5-5.0)  g/dL














  07/08/20 07/08/20 07/08/20 Range/Units





  04:26 04:26 05:08 


 


WBC  16.6 H    (3.8-10.6)  k/uL


 


RBC  3.40 L    (4.30-5.90)  m/uL


 


Hgb  11.0 L    (13.0-17.5)  gm/dL


 


Hct  34.1 L    (39.0-53.0)  %


 


Neutrophils #  14.1 H    (1.3-7.7)  k/uL


 


ABG pCO2    26 L  (35-45)  mmHg


 


ABG HCO3    18 L  (21-25)  mmol/L


 


ABG Total CO2    18 L  (19-24)  mmol/L


 


Sodium   126 L   (137-145)  mmol/L


 


Chloride   96 L   ()  mmol/L


 


Carbon Dioxide   18 L   (22-30)  mmol/L


 


BUN   99 H   (9-20)  mg/dL


 


Creatinine   2.64 H   (0.66-1.25)  mg/dL


 


Glucose   130 H   (74-99)  mg/dL


 


POC Glucose (mg/dL)     (75-99)  mg/dL


 


Calcium   7.3 L   (8.4-10.2)  mg/dL


 


Magnesium   3.3 H   (1.6-2.3)  mg/dL


 


AST   1225 H   (17-59)  U/L


 


ALT   867 H   (4-49)  U/L


 


Total Protein   5.1 L   (6.3-8.2)  g/dL


 


Albumin   2.9 L   (3.5-5.0)  g/dL














  07/08/20 Range/Units





  11:44 


 


WBC   (3.8-10.6)  k/uL


 


RBC   (4.30-5.90)  m/uL


 


Hgb   (13.0-17.5)  gm/dL


 


Hct   (39.0-53.0)  %


 


Neutrophils #   (1.3-7.7)  k/uL


 


ABG pCO2   (35-45)  mmHg


 


ABG HCO3   (21-25)  mmol/L


 


ABG Total CO2   (19-24)  mmol/L


 


Sodium   (137-145)  mmol/L


 


Chloride   ()  mmol/L


 


Carbon Dioxide   (22-30)  mmol/L


 


BUN   (9-20)  mg/dL


 


Creatinine   (0.66-1.25)  mg/dL


 


Glucose   (74-99)  mg/dL


 


POC Glucose (mg/dL)  193 H  (75-99)  mg/dL


 


Calcium   (8.4-10.2)  mg/dL


 


Magnesium   (1.6-2.3)  mg/dL


 


AST   (17-59)  U/L


 


ALT   (4-49)  U/L


 


Total Protein   (6.3-8.2)  g/dL


 


Albumin   (3.5-5.0)  g/dL














Assessment and Plan


Plan: 


-STEMI status post cardiac cath and stent placement in the RCA


Acute hypoxic respiratory failure secondary to congestive heart failure acute 

systolic dysfunction with the acute exacerbation, patient is presently intubated

patient was intubated on 07/07/2020


-Asystole, cardio pulmonary arrest and asystole for 7 minutes on 06/06/2020 

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


-Cardiac shock: Continue with the dobutamine,patient is also on Levophed drip


-New-onset atrial fibrillation continue with heparin continue with beta blocker 

patient was started on amiodarone if he goes back into A. fib,


Acute kidney injury prerenal azotemia secondary to heart failure exacerbation


-Hypervolemic hyponatremia secondary to heart failure


Hypotension


Fall, with no dizziness or syncope


-Patient is on levofloxacin because of possibly of pneumonia


Hypertension


Hyperlipidemia


History of prostate cancer status post radiotherapy

## 2020-07-08 NOTE — ECHOF
Referral Reason:code blue



MEASUREMENTS

--------

HEIGHT: 0.0 cm

WEIGHT: 0.0 kg

BP: 

RAP:   15.00 mmHg

RVSP:   39.32 mmHg







FINDINGS

--------

Limited Study

Overall left ventricular systolic function is moderately impaired with, an EF between 35 - 40 %.   Ba
sal lateral LV wall motion is akinetic.    Basal inferior LV wall motion is akinetic.    Basal infero
septal LV wall motion is akinetic.    Mid lateral LV wall motion is akinetic.    Mid inferior LV wall
 motion is akinetic.    Mid inferoseptal LV wall motion is akinetic.

There is mild aortic regurgitation.

Mild mitral regurgitation is present.

Mild-to-moderate tricuspid regurgitation present.   There is mild pulmonary hypertension.   The right
 ventricular systolic pressure, as measured by Doppler, is 39.32mmHg.

Trace/mild (physiologic)  pulmonic regurgitation.



CONCLUSIONS

--------

1. Limited Study

2. Overall left ventricular systolic function is moderately impaired with, an EF between 35 - 40 %.

3. Basal lateral LV wall motion is akinetic.

4. Basal inferior LV wall motion is akinetic.

5. Basal inferoseptal LV wall motion is akinetic.

6. Mid lateral LV wall motion is akinetic.

7. Mid inferior LV wall motion is akinetic.

8. Mid inferoseptal LV wall motion is akinetic.

9. There is mild aortic regurgitation.

10. Mild mitral regurgitation is present.

11. Mild-to-moderate tricuspid regurgitation present.

12. There is mild pulmonary hypertension.

13. Trace/mild (physiologic)  pulmonic regurgitation.





SONOGRAPHER: Samreen Eden RDCS

## 2020-07-08 NOTE — PCN
PROCEDURE NOTE



PROCEDURE:

Left dorsalis pedis arterial line placement.



Indications:  Hemodynamic monitoring.



Operators:

1. Tahir Burger M.D.

2. Susan Contreras.

3. Anastasiia Gama.



A universal time-out was completed verifying correct patient, procedure, site,

positioning, and implant(s) or special equipment if applicable.



Francis's test was performed to ensure adequate perfusion.  The patient's left dorsalis

pedis artery was employed. _____ was prepped and draped in sterile fashion.  1%

Lidocaine was used to anesthetize the area.  An 18G Arrow arterial line was introduced

into the left dorsalis pedis artery.  The catheter was threaded over the guide wire and

the needle was removed with appropriate pulsatile blood return and good waveform were

noted once the catheter was inserted to the left dorsalis pedis artery.  Blood loss was

minimal.  The catheter was then sutured in place to the skin and a sterile dressing

applied.  Perfusion to the extremity distal to the point of catheter insertion was

checked and found to be adequate.



The catheter was sutured in place.  A sterile dressing was applied by the nurse.  There

was no immediate complication.  The patient tolerated the procedure well.





MMODL / IJN: [id number] / Job#: [id number]

## 2020-07-08 NOTE — P.PN
Subjective


Progress Note Date: 07/08/20


This is a 79-year-old gentleman with history of hypertension who presented with 

inferior wall myocardial infarction.  Patient had a cardiac catheterization and 

stent placement of the mid RCA.  His CPK was high in the troponin was in the 

range of 77 on admission.  His echo Cardigan showed an ejection fraction of 45%.

 Patient is complaining of some congestion and seemed to be mild short of 

breath.  Chest x-ray showed evidence of congestive heart failure.  Heart 

appeared to be regular.  No peripheral edema.  I'm going to start him on IV 

Lasix 40 mg and start him by mouth Lasix.  We'll also give potassium supplement.

 Rest of the medication to be continued.  We'll continue follow his input and 

output.  Follow blood work tomorrow.  Further condition depend upon the clinical

course.  We'll continue to monitor him in intensive care unit








07/04/2020: This patient is admitted to the hospital with inferior wall MI, 

probably subacute.  Patient had stent placement.  His eldest was an ejection 

fraction of 40% to 45% with hypokinesis of the inferior wall.  Patient has been 

short of breath.  A chest x-ray showed some evidence of possible pulmonary edema

and possible atypical pneumonia involving the right lower lobe.  Patient does 

have white count elevation.  Clinically he does have a slow murmur in the apical

area and also left sternal border.  Rule out possibility of mitral 

regurgitation.  I'm going to repeat the echocardiogram.  His urine output is 

fair.  Pulmonary consult is requested regarding possible pneumonia.





07/05/2020: This patient is a status post subacute inferior wall MI and stent 

placement.  Patient is still complaining of shortness of breath and congestion. 

His lungs show scattered rhonchi and mild wheezing.  Chest x-ray shows bilateral

findings suggestive of pulmonary edema more so on the right side.  Atypical 

pneumonia cannot be excluded.  His blood pressure is running low.  His 

creatinine is also going up.  I'm going to start him on dobutamine 5 mics per 

KG.  Patient is also on Lasix.  Continue rest of the medication.  His repeat 

echocardiogram did not reveal any significant valvular abnormalities.  Patient 

does have systolic murmur.  We'll make consider COURTNEY examination ,if  patient 

doesn't improve promptly





07/06/2020: This patient is status post subacute inferior wall MI and stent 

placement of the RCA.  Patient hasn't been having good urinary output.  He s

eemed to have acute tubular necrosis.  He was started on dobutamine IV.  There 

is some pickup in the urinary output.  Patient comes is not feeling well.  His 

chest x-ray showed some improvement in the vascular congestion.  There is 

infiltrated in the right base which could be pneumonia.  His her empirically on 

antibiotics.  Patient is being followed by nephrology.  We'll continue monitor 

his renal functions.  Further recommendations depend upon critical course.  

Clinically patient has a systolic murmur.  However echocardiogram did not show 

any significant valvular abnormalities.  He patient condition doesn't improve, 

may consider COURTNEY examination.





07/07/2020: This patient is admitted with subacute inferior wall MI and had 

stent placement of the RCA.  Patient also has severe iliac disease bilaterally. 

Patient hasn't been progressing well.  Developed possible pneumonia and CHF and 

also renal failure.  Urine output has been low.  Patient was started on 

dobutamine.  Patient also had intermittent atrial fibrillation.  He was 

initiated on amiodarone yesterday.  Today he developed severe bradycardia and 

cardiac arrest.  Patient subsequently had a temporary pacemaker implantation.  

He still remains hypotensive.  He is on dobutamine and we also going to start 

him on Levophed.  Discussed with family about CODE STATUS and extent of care.  

They're going to discuss and decide that they did consent to proceed with 

temporary pacemaker.  Does have systolic murmur.  A repeat echocardiogram also 

showed mild mitral regurgitation.  Aortic valve function is normal.  We'll 

continue current medical therapy.  It.  Prognosis is guarded.  His creatinine is

about 2.67.  Nephrology is also following the case





7/8/2020: This patient is admitted with subacute inferior wall myocardial 

infarction complicated with CHF, renal failure and an episode  of severe 

bradycardia and also cardiac arrest.  Patient also has been having episodes of 

atrial fibrillation.  Currently patient had a, temporary pacemaker.  Patient is 

on IV pressors and also on IV dobutam, along with heparin.  His urine output has

 improved.  His creatinine is stable.  A chest x-ray show bilateral infiltrates 

and pleural effusion.  Will continue current.  Management.  Renal follow-up.  

Prognosis is guarded





Objective





- Vital Signs


Vital signs: 


                                   Vital Signs











Temp  97.8 F   07/08/20 12:00


 


Pulse  102 H  07/08/20 13:00


 


Resp  16   07/08/20 13:00


 


BP  124/82   07/08/20 13:00


 


Pulse Ox  96   07/08/20 13:00








                                 Intake & Output











 07/07/20 07/08/20 07/08/20





 18:59 06:59 18:59


 


Intake Total 2099.807 1433.692 514.000


 


Output Total 295 590 60


 


Balance 1804.807 843.692 454.000


 


Weight 78.5 kg 88.1 kg 


 


Intake:   


 


  .7 470 40


 


    0.9 120 230 20


 


    0.9NS Sheath 140 240 20


 


    Amiodarone 300 mg In 75  





    Dextrose 5% in Water 250   





    ml @ 0.5 MG/MIN 25 mls/hr   





    IV .Q10H [facility] Rx#:   





    [id number]   


 


    Amiodarone 360 mg In 33.3  





    Dextrose 5% in Water 200   





    ml @ 1 MG/MIN 33.333 mls/   





    hr IV .Q6H ONE Rx#:   





    [id number]   


 


    DOBUTamine DRIP 500 mg In 34.4  





    Dextrose/Water 1 250ml.   





    bag @ 5 MCG/KG/MIN 11.88   





    mls/hr IV .Q21H3M [facility] Rx#   





    [id number]   


 


  Intake, IV Titration 1567.107 693.692 354.000





  Amount   


 


    Amiodarone 300 mg In 184.167  





    Dextrose 5% in Water 250   





    ml @ 0.5 MG/MIN 25 mls/hr   





    IV .Q10H [facility] Rx#:   





    [id number]   


 


    DOBUTamine DRIP 500 mg In 91.377 109.89 





    Dextrose/Water 1 250ml.   





    bag @ 5 MCG/KG/MIN 11.88   





    mls/hr IV .Q21H3M [facility] Rx#   





    :[id number]   


 


    Heparin Sod,Pork in 0.45% 250.000 155 





    NaCl 25,000 unit In 0.45   





    % NaCl 1 250ml.bag @ 12.5   





    UNITS/KG/HR 9.95 mls/hr   





    IV .Q24H [facility] Rx#:   





    [id number]   


 


    Norepinephrine 4 mg In 8.976 168.713 254.000





    Sodium Chloride 0.9% 250   





    ml @ 0.05 MCG/KG/MIN 14.   





    173 mls/hr IV .[id number]   





    [facility] Rx#:[id number]   


 


    Propofol 1,000 mg In 32.587 260.089 100





    Empty Bag 1 bag @ Titrate   





    IV .Q0M [facility] Rx#:   





    [id number]   


 


    Sodium Chloride 0.9% 1, 1000  





    000 ml @ 999 mls/hr IV .   





    Q1H1M ONE Rx#:[id number]   


 


  Oral 60  


 


  Tube Feeding 40 180 120


 


  Other 30 90 


 


Output:   


 


  Urine 295 590 60


 


Other:   


 


  Voiding Method Indwelling Catheter Indwelling Catheter Indwelling Catheter








                       ABP, PAP, CO, CI - Last Documented











Arterial Blood Pressure        113/67

















- Exam





GENERAL EXAM: Patient is intubated and sedated


HEENT: Normocephalic. Normal reaction of pupils, equal size, normal range of 

extraocular motion. No erythema or exudates in the throat.


NECK: No masses, no nuchal rigidity.


CHEST: No chest wall deformity.


LUNGS: Scattered rhonchi and wheezes


HEART: S1, S2 heard.  Systolic murmur heard at the apex and left sternal border


ABDOMEN: No hepatosplenomegaly, normal bowel sounds, no guarding or rigidity.


SKIN: No rashes


CENTRAL NERVOUS SYSTEM: No focal deficits.


EXTREMITIES: No cyanosis, clubbing or edema.





- Labs


CBC & Chem 7: 


                                 07/08/20 04:26





                                 07/08/20 04:26


Labs: 


                  Abnormal Lab Results - Last 24 Hours (Table)











  07/07/20 07/07/20 07/08/20 Range/Units





  18:21 23:41 04:26 


 


WBC    16.6 H  (3.8-10.6)  k/uL


 


RBC    3.40 L  (4.30-5.90)  m/uL


 


Hgb    11.0 L  (13.0-17.5)  gm/dL


 


Hct    34.1 L  (39.0-53.0)  %


 


Neutrophils #    14.1 H  (1.3-7.7)  k/uL


 


APTT     (22.0-30.0)  sec


 


ABG pCO2     (35-45)  mmHg


 


ABG HCO3     (21-25)  mmol/L


 


ABG Total CO2     (19-24)  mmol/L


 


Sodium     (137-145)  mmol/L


 


Chloride     ()  mmol/L


 


Carbon Dioxide     (22-30)  mmol/L


 


BUN     (9-20)  mg/dL


 


Creatinine     (0.66-1.25)  mg/dL


 


Glucose     (74-99)  mg/dL


 


POC Glucose (mg/dL)  130 H  150 H   (75-99)  mg/dL


 


Calcium     (8.4-10.2)  mg/dL


 


Magnesium     (1.6-2.3)  mg/dL


 


AST     (17-59)  U/L


 


ALT     (4-49)  U/L


 


Total Protein     (6.3-8.2)  g/dL


 


Albumin     (3.5-5.0)  g/dL














  07/08/20 07/08/20 07/08/20 Range/Units





  04:26 05:08 11:23 


 


WBC     (3.8-10.6)  k/uL


 


RBC     (4.30-5.90)  m/uL


 


Hgb     (13.0-17.5)  gm/dL


 


Hct     (39.0-53.0)  %


 


Neutrophils #     (1.3-7.7)  k/uL


 


APTT    73.4 H  (22.0-30.0)  sec


 


ABG pCO2   26 L   (35-45)  mmHg


 


ABG HCO3   18 L   (21-25)  mmol/L


 


ABG Total CO2   18 L   (19-24)  mmol/L


 


Sodium  126 L    (137-145)  mmol/L


 


Chloride  96 L    ()  mmol/L


 


Carbon Dioxide  18 L    (22-30)  mmol/L


 


BUN  99 H    (9-20)  mg/dL


 


Creatinine  2.64 H    (0.66-1.25)  mg/dL


 


Glucose  130 H    (74-99)  mg/dL


 


POC Glucose (mg/dL)     (75-99)  mg/dL


 


Calcium  7.3 L    (8.4-10.2)  mg/dL


 


Magnesium  3.3 H    (1.6-2.3)  mg/dL


 


AST  1225 H    (17-59)  U/L


 


ALT  867 H    (4-49)  U/L


 


Total Protein  5.1 L    (6.3-8.2)  g/dL


 


Albumin  2.9 L    (3.5-5.0)  g/dL














  07/08/20 Range/Units





  11:44 


 


WBC   (3.8-10.6)  k/uL


 


RBC   (4.30-5.90)  m/uL


 


Hgb   (13.0-17.5)  gm/dL


 


Hct   (39.0-53.0)  %


 


Neutrophils #   (1.3-7.7)  k/uL


 


APTT   (22.0-30.0)  sec


 


ABG pCO2   (35-45)  mmHg


 


ABG HCO3   (21-25)  mmol/L


 


ABG Total CO2   (19-24)  mmol/L


 


Sodium   (137-145)  mmol/L


 


Chloride   ()  mmol/L


 


Carbon Dioxide   (22-30)  mmol/L


 


BUN   (9-20)  mg/dL


 


Creatinine   (0.66-1.25)  mg/dL


 


Glucose   (74-99)  mg/dL


 


POC Glucose (mg/dL)  193 H  (75-99)  mg/dL


 


Calcium   (8.4-10.2)  mg/dL


 


Magnesium   (1.6-2.3)  mg/dL


 


AST   (17-59)  U/L


 


ALT   (4-49)  U/L


 


Total Protein   (6.3-8.2)  g/dL


 


Albumin   (3.5-5.0)  g/dL














Assessment and Plan


(1) Acute transmural inferior wall MI


Current Visit: Yes   Status: Acute   Code(s): I21.19 - STEMI INVOLVING OTH 

CORONARY ARTERY OF INFERIOR WALL   SNOMED Code(s): 77050068


   





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


Current Visit: Yes   Status: Acute   Code(s): I50.41 - ACUTE COMBINED SYSTOLIC 

AND DIASTOLIC (CONGESTIVE) HRT FAIL   SNOMED Code(s): 396058457277930


   





(3) Essential hypertension


Current Visit: Yes   Status: Acute   Code(s): I10 - ESSENTIAL (PRIMARY) 

HYPERTENSION   SNOMED Code(s): 61965666


   





(4) Cardiac arrest


Current Visit: Yes   Status: Acute   Code(s): I46.9 - CARDIAC ARREST, CAUSE 

UNSPECIFIED   SNOMED Code(s): 747508515


   


Plan: 


His urine output has  improved.  Creatinine is slightly better.  Chest x-ray 

shows worsening infiltrates and effusion.  We will continue current  medical 

therapy.  Renal follow-up.  Further recommendations will depend upon clinical  

Course.

## 2020-07-08 NOTE — PN
PROGRESS NOTE



PULMONARY/CRITICAL CARE PROGRESS NOTE:



DATE OF SERVICE:

07/08/2020



Critical care time 33 minutes.



This is a 79-year-old male who was admitted on July 2 for an ST-segment elevation

myocardial infarction.  He had a stent placed in his right coronary artery.  He was

admitted with a diagnosis of CHF, hypoxemia, and atrial fibrillation.  Yesterday, the

patient unfortunately sustained an episode of asystole.  He was intubated on July 8.

He had 7 minutes of cardiopulmonary arrest with 7 minutes of cardiopulmonary

resuscitation.  He had a vent.  He had eventual return of spontaneous circulation after

being intubated and receiving 1 dose of atropine.  Apparently, he recently was on

amiodarone for atrial fibrillation.  Currently, the patient is on the volume assist-

control mode rate of 12, tidal volume 450, FiO2 of 50%, PEEP of 5.  Gases show a pO2 of

86, pCO2 of 26, and pH of 7.43.  These blood gases consistent with a mixed acid-base

disturbance including combined respiratory alkalosis/metabolic acidosis.  The patient

has a loud systolic murmur mostly consistent in my opinion with aortic stenosis.  The

patient is receiving dobutamine at 5 mcg/kg per minute, norepinephrine at 8 mcg/minute,

propofol 40 mcg/kg per minute, heparin via weight based protocol, 0.9 at 20 mL an hour,

and Vital high-protein at 30 with a goal of 53 mL an hour.



Vital signs are reviewed. Temperature is 96.2, heart rate 71, respiratory rate is 12,

blood pressure 97/61 mean 73, saturations are 95%.  Appears in no acute distress.

Currently sedated.

HEENT: Examination is grossly unremarkable.  There is an orally placed endotracheal

tube and NG tube.  Neck is supple.  Full range of motion.  No adenopathy.  Neck veins

are flat.

CARDIOVASCULAR: Examination reveals regular rhythm rate.  Heart rate 71 beats per

minute.  S1, S2 normal.  Heart sounds are distant.

LUNGS: Reveal diffuse coarse rhonchi and crackles.  No wheezes.  Breath sounds equal.

Abdomen soft bowel sounds are heard.

EXTREMITIES are intact.  Slight edema noted.

SKIN: Without rash.

NEUROLOGIC: Examination is difficult to assess given the fact he is currently intubated

and sedated.



LABS:

Reviewed.  White count 16.6, hemoglobin 11, hematocrit 34.1, platelet count 364,000 PTT

27.1.  Blood gases have been mentioned.  Sodium 126, potassium 3.9, chloride 96, CO2 18

anion gap is 12. BUN and creatinine were 99 and 2.64.  Magnesium is 3.3, bilirubin 1.1,

AST 1225, , likely representing a shocked liver.  Albumin 2.9.



Microbiology is negative or pending.



Chest x-ray shows diffuse bilateral infiltrates with bilateral effusions consistent

with fluid overload.



Orders are reviewed.



ASSESSMENT:

1. Status post cardiopulmonary arrest with asystole, and 7 minutes of cardiopulmonary

    resuscitation.

2. Hypoxemic respiratory failure requiring intubation and mechanical ventilation on

    July 8, 2020.

3. Acute inferior wall ST-segment elevation myocardial infarction, status post

    stenting of the right coronary artery.

4. Acute pulmonary edema secondary to acute systolic congestive heart failure and

    ischemic cardiomyopathy.

5. Benign essential hypertension.

6. Hyperlipidemia.

7. History of mild dementia.

8. Atrial fibrillation with RVR.

9. History of acute kidney injury.

10.Possible right lower lobe pneumonia.

11.Loud systolic murmur, rule out aortic valve disease.



PLAN:

An echocardiogram was done yesterday after the cardiopulmonary arrest.  The results are

pending.  The patient remains on dobutamine, norepinephrine, propofol, IV heparin, and

saline.  The patient is also getting nourished enterally with vital high-protein, but

is not at goal.  Blood gases showed combined mixed acid-base disturbance with a

combined respiratory alkalosis/metabolic acidosis.  Overall prognosis remains guarded.

Will continue to follow.  Additional recommendations and suggestions are forthcoming.

.

Critical care time 33 minutes.





MMODL / IJN: [id number] / Job#: [id number]

## 2020-07-08 NOTE — PN
PROGRESS NOTE



Patient is seen for followup for acute kidney injury, mostly ATN., initially oliguric

currently nonoliguric.  Patient is status post cardiac.  Patient is status post Code

Blue yesterday with the severe bradycardia.  He was eventually intubated.  Patient also

had a pacemaker placed.  He currently remains on the vent.  Levophed is actually

increased to 0.7 mcg/kg.  Urine output has picked up to about 60-75 cc an hour now.

The patient is sedated.  He is maintained on IV dobutamine and heparin drip along with

the Levophed.  Patient is tolerating tube feeds.



PHYSICAL EXAMINATION:

On examination today, patient is sedated he is on the vent.  Blood pressure was 97/61,

heart rate 71 per minute he is afebrile examination of the heart S1, S2.  Examination

of the lungs, bilateral breath sounds are heard.  Abdomen is soft, nontender.

Examination of lower extremities shows no significant edema.



LAB:

1. Show lab show sodium 126, potassium 3.9, chloride 96, CO2 is 18, BUN 99, creatinine

    2.64 assessment acute kidney injury ATN secondary to contrast nephropathy and hypo

    tension and hypoperfusion, currently nonoliguric.  Serum creatinine staying about

    the same.

2. Hyponatremia associated with hypotonic fluid with dobutamine as well as underlying

    renal failure serum sodium staying stable at about 126-127 mEq/L.  I will hold off

    on the free water down the feeding tube.

3. Metabolic acidosis maintained on oral sodium bicarb.

4. Status post acute MI status post coronary stent placement and pacemaker placement

    for bradycardia.

5. Intermittent AF or atrial fibrillation, maintained on IV heparin.  The plan hold

    free water down the feeding tube.  Continue with the sodium bicarb tablets.  Avoid

    nephrotoxic agents.  Repeat labs in a.m.





MMODL / IJN: [id number] / Job#: [id number]

## 2020-07-09 LAB
ALBUMIN SERPL-MCNC: 2.8 G/DL (ref 3.5–5)
ALP SERPL-CCNC: 136 U/L (ref 38–126)
ALT SERPL-CCNC: 862 U/L (ref 4–49)
ANION GAP SERPL CALC-SCNC: 10 MMOL/L
AST SERPL-CCNC: 970 U/L (ref 17–59)
BUN SERPL-SCNC: 78 MG/DL (ref 9–20)
CALCIUM SPEC-MCNC: 7.3 MG/DL (ref 8.4–10.2)
CELLS COUNTED: 200
CHLORIDE SERPL-SCNC: 103 MMOL/L (ref 98–107)
CO2 BLDA-SCNC: 21 MMOL/L (ref 19–24)
CO2 SERPL-SCNC: 20 MMOL/L (ref 22–30)
EOSINOPHIL # BLD MANUAL: 0.23 K/UL (ref 0–0.7)
ERYTHROCYTE [DISTWIDTH] IN BLOOD BY AUTOMATED COUNT: 3.51 M/UL (ref 4.3–5.9)
ERYTHROCYTE [DISTWIDTH] IN BLOOD: 13.7 % (ref 11.5–15.5)
GLUCOSE BLD-MCNC: 171 MG/DL (ref 75–99)
GLUCOSE BLD-MCNC: 174 MG/DL (ref 75–99)
GLUCOSE BLD-MCNC: 197 MG/DL (ref 75–99)
GLUCOSE BLD-MCNC: 198 MG/DL (ref 75–99)
GLUCOSE BLD-MCNC: 204 MG/DL (ref 75–99)
GLUCOSE BLD-MCNC: 233 MG/DL (ref 75–99)
GLUCOSE SERPL-MCNC: 175 MG/DL (ref 74–99)
HCO3 BLDA-SCNC: 20 MMOL/L (ref 21–25)
HCT VFR BLD AUTO: 35 % (ref 39–53)
HGB BLD-MCNC: 11.7 GM/DL (ref 13–17.5)
LYMPHOCYTES # BLD MANUAL: 2.05 K/UL (ref 1–4.8)
MAGNESIUM SPEC-SCNC: 3.5 MG/DL (ref 1.6–2.3)
MCH RBC QN AUTO: 33.5 PG (ref 25–35)
MCHC RBC AUTO-ENTMCNC: 33.5 G/DL (ref 31–37)
MCV RBC AUTO: 99.8 FL (ref 80–100)
METAMYELOCYTES # BLD: 0.46 K/UL
MONOCYTES # BLD MANUAL: 1.14 K/UL (ref 0–1)
MYELOCYTES # BLD MANUAL: 0.46 K/UL
NEUTROPHILS NFR BLD MANUAL: 82 %
NEUTS SEG # BLD MANUAL: 18.9 K/UL (ref 1.3–7.7)
PCO2 BLDA: 31 MMHG (ref 35–45)
PH BLDA: 7.44 [PH] (ref 7.35–7.45)
PLATELET # BLD AUTO: 355 K/UL (ref 150–450)
PO2 BLDA: 85 MMHG (ref 83–108)
POTASSIUM SERPL-SCNC: 3.4 MMOL/L (ref 3.5–5.1)
PROT SERPL-MCNC: 5.2 G/DL (ref 6.3–8.2)
SODIUM SERPL-SCNC: 133 MMOL/L (ref 137–145)
WBC # BLD AUTO: 22.8 K/UL (ref 3.8–10.6)

## 2020-07-09 RX ADMIN — NOREPINEPHRINE BITARTRATE SCH: 1 INJECTION, SOLUTION, CONCENTRATE INTRAVENOUS at 06:13

## 2020-07-09 RX ADMIN — IPRATROPIUM BROMIDE AND ALBUTEROL SULFATE SCH: .5; 3 SOLUTION RESPIRATORY (INHALATION) at 04:33

## 2020-07-09 RX ADMIN — IPRATROPIUM BROMIDE AND ALBUTEROL SULFATE SCH ML: .5; 3 SOLUTION RESPIRATORY (INHALATION) at 23:58

## 2020-07-09 RX ADMIN — NOREPINEPHRINE BITARTRATE SCH: 1 INJECTION, SOLUTION, CONCENTRATE INTRAVENOUS at 00:09

## 2020-07-09 RX ADMIN — NOREPINEPHRINE BITARTRATE SCH MLS/HR: 1 INJECTION, SOLUTION, CONCENTRATE INTRAVENOUS at 06:34

## 2020-07-09 RX ADMIN — Medication SCH MG: at 09:08

## 2020-07-09 RX ADMIN — IPRATROPIUM BROMIDE AND ALBUTEROL SULFATE SCH ML: .5; 3 SOLUTION RESPIRATORY (INHALATION) at 19:52

## 2020-07-09 RX ADMIN — PROPOFOL SCH MLS/HR: 10 INJECTION, EMULSION INTRAVENOUS at 20:15

## 2020-07-09 RX ADMIN — CLOPIDOGREL BISULFATE SCH MG: 75 TABLET ORAL at 09:08

## 2020-07-09 RX ADMIN — MORPHINE SULFATE PRN MG: 2 INJECTION, SOLUTION INTRAMUSCULAR; INTRAVENOUS at 21:57

## 2020-07-09 RX ADMIN — Medication SCH MG: at 20:25

## 2020-07-09 RX ADMIN — DOBUTAMINE HYDROCHLORIDE SCH MLS/HR: 200 INJECTION INTRAVENOUS at 20:46

## 2020-07-09 RX ADMIN — INSULIN ASPART SCH UNIT: 100 INJECTION, SOLUTION INTRAVENOUS; SUBCUTANEOUS at 23:46

## 2020-07-09 RX ADMIN — PANTOPRAZOLE SODIUM SCH MG: 40 INJECTION, POWDER, FOR SOLUTION INTRAVENOUS at 09:08

## 2020-07-09 RX ADMIN — CHLORHEXIDINE GLUCONATE SCH: 1.2 RINSE ORAL at 00:10

## 2020-07-09 RX ADMIN — ATORVASTATIN CALCIUM SCH MG: 80 TABLET, FILM COATED ORAL at 20:25

## 2020-07-09 RX ADMIN — PROPOFOL SCH MLS/HR: 10 INJECTION, EMULSION INTRAVENOUS at 10:08

## 2020-07-09 RX ADMIN — IPRATROPIUM BROMIDE AND ALBUTEROL SULFATE SCH ML: .5; 3 SOLUTION RESPIRATORY (INHALATION) at 11:43

## 2020-07-09 RX ADMIN — NOREPINEPHRINE BITARTRATE SCH MLS/HR: 1 INJECTION, SOLUTION, CONCENTRATE INTRAVENOUS at 16:22

## 2020-07-09 RX ADMIN — PROPOFOL SCH MLS/HR: 10 INJECTION, EMULSION INTRAVENOUS at 15:52

## 2020-07-09 RX ADMIN — DOBUTAMINE HYDROCHLORIDE SCH MLS/HR: 200 INJECTION INTRAVENOUS at 06:34

## 2020-07-09 RX ADMIN — CHLORHEXIDINE GLUCONATE SCH ML: 1.2 RINSE ORAL at 09:08

## 2020-07-09 RX ADMIN — Medication SCH MG: at 16:28

## 2020-07-09 RX ADMIN — INSULIN ASPART SCH UNIT: 100 INJECTION, SOLUTION INTRAVENOUS; SUBCUTANEOUS at 14:54

## 2020-07-09 RX ADMIN — HEPARIN SODIUM SCH MLS/HR: 10000 INJECTION, SOLUTION INTRAVENOUS at 22:01

## 2020-07-09 RX ADMIN — INSULIN ASPART SCH: 100 INJECTION, SOLUTION INTRAVENOUS; SUBCUTANEOUS at 00:18

## 2020-07-09 RX ADMIN — ATORVASTATIN CALCIUM SCH: 80 TABLET, FILM COATED ORAL at 00:09

## 2020-07-09 RX ADMIN — POTASSIUM BICARBONATE SCH MEQ: 782 TABLET, EFFERVESCENT ORAL at 09:07

## 2020-07-09 RX ADMIN — INSULIN ASPART SCH UNIT: 100 INJECTION, SOLUTION INTRAVENOUS; SUBCUTANEOUS at 19:00

## 2020-07-09 RX ADMIN — INSULIN ASPART SCH: 100 INJECTION, SOLUTION INTRAVENOUS; SUBCUTANEOUS at 06:12

## 2020-07-09 RX ADMIN — DOBUTAMINE HYDROCHLORIDE SCH: 200 INJECTION INTRAVENOUS at 00:10

## 2020-07-09 RX ADMIN — LEVOFLOXACIN SCH MG: 250 TABLET, FILM COATED ORAL at 20:25

## 2020-07-09 RX ADMIN — POTASSIUM BICARBONATE SCH MEQ: 782 TABLET, EFFERVESCENT ORAL at 06:20

## 2020-07-09 RX ADMIN — Medication SCH: at 00:10

## 2020-07-09 RX ADMIN — IPRATROPIUM BROMIDE AND ALBUTEROL SULFATE SCH ML: .5; 3 SOLUTION RESPIRATORY (INHALATION) at 07:28

## 2020-07-09 RX ADMIN — METOPROLOL TARTRATE SCH: 25 TABLET, FILM COATED ORAL at 00:10

## 2020-07-09 RX ADMIN — CHLORHEXIDINE GLUCONATE SCH ML: 1.2 RINSE ORAL at 20:25

## 2020-07-09 RX ADMIN — INSULIN ASPART SCH: 100 INJECTION, SOLUTION INTRAVENOUS; SUBCUTANEOUS at 00:09

## 2020-07-09 RX ADMIN — IPRATROPIUM BROMIDE AND ALBUTEROL SULFATE SCH ML: .5; 3 SOLUTION RESPIRATORY (INHALATION) at 16:23

## 2020-07-09 RX ADMIN — LEVOFLOXACIN SCH: 250 TABLET, FILM COATED ORAL at 00:10

## 2020-07-09 NOTE — PN
PROGRESS NOTE



Patient is seen for followup for acute kidney injury, mainly ATN associated with

hypotension, hypoperfusion.  Renal function has improved.  Urine output is stable at 60

to 100 mL an hour.  This morning sedation is being weaned off.  Patient is stable on

the vent.  FiO2 is at 50%.  He remains on Levophed, now down to about 0.4 mcg/kg from

0.7 yesterday.



PHYSICAL EXAMINATION:

On examination today, blood pressure 102/66, heart rate 102 per minute, he is afebrile.

Examination of the heart S1, S2.  Examination of the lungs, bilateral breath sounds are

heard.  Abdomen is soft, nontender.  Examination of the lower extremities shows no

evidence of edema.  CNS exam cannot be assessed.



LABS:

Show sodium 133, potassium 3.4, chloride 103, CO2 is 20, BUN 70, creatinine 1.87,

hemoglobin 11.7 g/dL.



ASSESSMENT:

1. Acute kidney injury ATN secondary to contrast nephropathy and hemodynamic

    instability.  Currently, nonoliguric initially oliguric.  Renal function is

    improving.

2. Status post cardiac arrest with bradycardia, currently not requiring the temporary

    pacemaker that was placed post cardiac arrest.

3. Hypokalemia, status post replacement.

4. Ventilator-dependent respiratory failure, status post cardiac arrest.  Currently

    sedation is being weaned off, patient is stable on the vent.

5. Status post acute myocardial infarction, status post cardiac catheterization and

    coronary stent placement to mid right coronary artery.

6. Cardiomyopathy, ejection fraction at 45%.

7. Congestive heart failure, acute on top of chronic, currently improved, now patient

    is not on Lasix.

8. Hyponatremia, associated with hypotonic fluid and renal failure, currently

    improved.  Patient is not maintained on any free water down the feeding tube.



PLAN:

Continue off IV fluids as well as Lasix.  Repeat labs in a.m. Continue to avoid

nephrotoxic agents.



MMODL / IJN: [id number] / Job#: [id number]

## 2020-07-09 NOTE — PN
PROGRESS NOTE



PULMONARY/CRITICAL CARE PROGRESS NOTE:



DATE OF SERVICE:

07/09/2020



Critical care time 33 minutes.



This is a 79-year-old male admitted back on July 2 for ST-segment elevation myocardial

infarction.  He had a stent placed in his right coronary artery.  He was admitted with

a diagnosis of MI, CHF, hypoxemia, and atrial fibrillation.  Two days ago, he had an

episode of asystole in cardiopulmonary arrest.  He was intubated on July 7.  He had 7

minutes of cardiopulmonary arrest with 7 minutes of cardiopulmonary resuscitation.  He

was intubated and mechanically ventilated.  He did have eventual return of spontaneous

circulation.  The patient currently is on multiple drips including dobutamine at 5

mcg/kg per minute, norepinephrine 24 mcg/minute, heparin via weight-based protocol,

propofol at 50 mcg/kg per minute and saline at 40 mL an hour.  Vent settings include

the volume assist-control mode rate of 12, tidal volume 450, FiO2 of 50%, PEEP of 5.

Blood gases show a pO2 of 84, pCO2 of 30, and a pH of 7.43.  Blood gases consistent

with normoxemia in a mixed acid-base disturbance including a respiratory alkalosis and

metabolic acidosis.  I did tell the nurses that we wanted a daily interruption of

sedation today.  He is probably not weanable at this point.  The patient has been

having episodes of atrial fibrillation with RVR.  He also is receiving nutrition

ventrally via Vital high-protein at 53 with a goal of 53 mL an hour.



Current vital signs reviewed. Temperature is 97.9, heart rate 107, respiratory rate is

12, blood pressure is 98/59 with mean 72, saturations are 93% on the 50%, 5 of PEEP.

Appears in no acute distress.  Currently sedated.

HEENT: Examination is grossly unremarkable.  There is an orally placed endotracheal

tube and NG tube.

NECK: Supple, full range of motion.  No adenopathy.  Neck veins are flat.  There is a

left subclavian triple-lumen catheter in place.

CARDIOVASCULAR: Examination reveals tachycardia.  He is in atrial fibrillation.  He has

a loud systolic murmur.

LUNGS: Reveal diffuse coarse rhonchi.  Breath sounds equal.  No crackles.

ABDOMEN:  Soft, bowel sounds are noted.

EXTREMITIES: Intact.  No edema.  There is a left dorsalis pedis arterial line.

SKIN: Without rash.

NEUROLOGIC: Examination cannot be currently evaluated because the patient is sedated

heavily on propofol.



LABS:

Reviewed.  White count 22.8, hemoglobin 11.7, hematocrit 35.0, platelet count 355,000.

PTT is 91.1.  Blood gases have been noted.  Sodium 133, potassium 3.4, chloride 103,

CO2 is 20, anion gap is 10. BUN and creatinine were 78 and 1.87.  , ,

albumin 2.8.



Microbiology thus far is negative.



A chest x-ray from this morning shows changes of CHF.



MEDICATIONS:

Reviewed.  The patient is on Lipitor, chlorhexidine, Plavix, dobutamine, IV heparin,

p.r.n. insulin, updrafts with DuoNeb, Levaquin, metoprolol, morphine sulfate, Narcan,

sublingual nitroglycerin which will be discontinued, norepinephrine, Protonix,

potassium replacement protocol, propofol, sodium bicarbonate tablets, and Flomax.



ASSESSMENT:

1. Status post cardiopulmonary arrest with asystole and 7 minutes of cardiopulmonary

    resuscitation with eventual return of spontaneous circulation, of unclear etiology.

2. Hypoxemic respiratory failure requiring intubation and mechanical ventilation on

    July 7, 2020.

3. Acute inferior wall ST-segment elevation myocardial infarction, status post

    stenting of the right coronary artery.

4. Acute pulmonary edema secondary to acute systolic congestive heart failure and

    ischemic cardiomyopathy.

5. Benign essential hypertension.

6. Hyperlipidemia.

7. History of mild dementia.

8. Atrial fibrillation with RVR.

9. History of acute kidney injury.

10.Possible right lower lobe pneumonia.

11.Loud systolic murmur of unclear etiology.  Without echocardiographic evidence of

    valvular heart disease.

12.Congestive hepatopathy.



PLAN:

The patient will continue on his current medications including dobutamine and

norepinephrine.  He remains on IV heparin and propofol for sedation.  He is getting

nourished centrally with Vital high-protein at 53 mL and hour which is goal.  His blood

gases are reasonable.  No vent changes today.  We did speak to Cardiology.  They have

no good explanation for the patient's loud heart murmur.  We will do a daily

interruption of sedation, although the patient is not a candidate for weaning.

Additional recommendations and suggestions are forthcoming.  Prognosis is guarded.



Critical care time 33 minutes.





MMODL / IJN: [id number] / Job#: [id number]

## 2020-07-09 NOTE — P.PN
Subjective





79 years old male with past medical history of hypertension, hyperlipidemia, 

dementia, prostate cancer status post radiotherapy , patient presents because of

chest pain found to have STEMI, he underwent cardiac cath status post stent 

placement in the right coronary artery.  He is in the ICU.


  His creatinine increased today to 1.4, WBC is high at 18.6 and 19.6 K, sodium 

131, his chest x-ray showing pulmonary congestion with possible interstitial 

pneumonia


He saturating 90s on 7 L/m oxygen via nasal cannula.


Cardiologist already given 1 dose of Lasix IV and continued on 40 mg by mouth 

daily, 1 going to give him another dose of Lasix.  Blood pressure is on the low 

normal but he is tachypneic at 35.  A but that side nurse his breathing is li

ttle better in the evening than in the morning and he did not need BiPAP for 

now.


He is also on aspirin and Plavix, he is on metoprolol 12.5 mg and lisinopril 2.5

mg, we going to hold lisinopril for now


Also call pulmonary and nephrology consult





07/04/2020


Patient breathing easier but however his significantly tachypneic and mid 20s, 

he saturating 94% on 9 L oxygen via nasal cannula.  His blood pressure 96/57 and

heart rate 77.


He still bleeding from dyspnea but no chest pain, he has little cough with clear

phlegm.


This morning he was trying to get out of bed to the restroom by himself when he 

fell on hit his head in the forehead area.


Chest x-ray: Pulmonary edema, atypical pneumonia, some right lower lobe 

pneumonia


WBC this morning is 18.1 K with slight improvement.  Sodium 132, creatinine 

stable at 1.5.  Procol stone and is elevated at 0.23


Patient is currently on by mouth Lasix, lisinopril is held, cardiologist 

recommended to continue with metoprolol 12.5, we going to check with cardiology 

about holding parameters.  Continue with Levaquin.


Ordered EKG








7/5/20


Patient awake and alert, his breathing is easier although he still complains 

from some chest pain with little dyspnea and the 10 cough.


He is saturating 94% on 3-5 L of oxygen via nasal cannula.  Rest of Vitas looks 

stable, however her blood pressure is on the low side, this morning was 78/56-

94/59 


WBC is 17.1 K, hemoglobin 12.2, sodium 128, creatinine went up to 2.3.  Lactic 

acid is elevated at 3.8.  ProBNP is 41 500 


sputum culture is pending.  Chest x-ray showing stable changes with diffuse 

parenchymal changes or diffuse pneumonia or CHF/pulmonary edema


Cardiology started the patient on dobutamine drip, also he got 1 dose of Lasix 

60 mg IV 1 and on sodium bicarbonate tablets.


Patient also is on Levaquin 250 mg by mouth daily, which is renal dose.  

Pulmonary nephrology and cardiology teams on the case





07/06/2020


Patient is still complaining of shortness of breath patient has significant 

rhonchi streaking heart failure exacerbation patient is presently on dobutamine 

drip does have atrial fibrillation for which patient is on Cardizem, patient is 

still hyponatremic receiving sodium bicarbonate tablets is also on Lasix, 

patient is on metoprolol as well.  Patient was started on IV heparin.  We will d

yspnea Cardizem as it decreases as it [facility] heart failure.  

Nephrology is following the patient patient probably has hypovolemic 

hyponatremia





007/07/2020


Patient went into asystole after which the patient had a temporary transvenous a

speckled placement , patient was also intubated because of respiratory failure. 

Patient is presently on dopamine drip and also on norepinephrine drip.  Patient 

is presently sinus rhythm.  Patient still has pulmonary edema on the chest x-ray

nephrology is managing diuretics patient serum sodium continues to be low at 

124we creatinine continued to go up to subacute 2.79





July 2020


Patient remains on ventilator support with FiO2 of 50% deep of 5 respiratory set

up respiratory rate of 12.  Blood gases did not show any significant 

abnormality.  Patient remains are not epinephrine 8 mcg/m dobutamine drip 

propofol drip heparin drip presently sinus rhythm, nutrition via NG tube





07/09/2020


No significant change in his overall clinical condition except for some 

improvement in his serum sodium of 133 white blood cell count continued to go up

patient remains on ventilator support patient is on 2 pressors.  Fairly good 

urine output liver enzymes are coming down patient had a sick sedation 

medication today hypokalemia potassium is being replaced





review of systems: Unable to obtain as patient is intubated and sedated at this 

time





All inpatient medications were reviewed and appropriate changes in these 

medications as dictated in the interval history and assessment and plan.








Objective





- Vital Signs


Vital signs: 


                                   Vital Signs











Temp  98.9 F   07/09/20 12:00


 


Pulse  109 H  07/09/20 16:37


 


Resp  13   07/09/20 15:00


 


BP  119/75   07/09/20 12:00


 


Pulse Ox  97   07/09/20 15:00








                                 Intake & Output











 07/08/20 07/09/20 07/09/20





 18:59 06:59 18:59


 


Intake Total 2855.068 2157.350 1318.800


 


Output Total 1490 1225 760


 


Balance 386.000 242.350 558.800


 


Weight  81.4 kg 81.4 kg


 


Intake:   


 


   516 387


 


    0.9 240 240 180


 


    0.9NS Sheath 240 240 180


 


    Arterial Line 27 36 27


 


  Intake, IV Titration 799.000 395.350 304.800





  Amount   


 


    DOBUTamine DRIP 500 mg In 250  





    Dextrose/Water 1 250ml.   





    bag @ 5 MCG/KG/MIN 11.88   





    mls/hr IV .Q21H3M [facility] Rx#   





    :[id number]   


 


    Heparin Sod,Pork in 0.45% 95 145.350 63.143





    NaCl 25,000 unit In 0.45   





    % NaCl 1 250ml.bag @ 12.5   





    UNITS/KG/HR 9.95 mls/hr   





    IV .Q24H [facility] Rx#:   





    [id number]   


 


    Norepinephrine 32 mg In  250 156.635





    Sodium Chloride 0.9% 218   





    ml @ 0.65 MCG/KG/MIN 26.   





    843 mls/hr IV .Q9H19M [facility]   





    Rx#:[id number]   


 


    Norepinephrine 4 mg In 254.000  





    Sodium Chloride 0.9% 250   





    ml @ 0.05 MCG/KG/MIN 14.   





    173 mls/hr IV .[id number]   





    [facility] Rx#:[id number]   


 


    Propofol 1,000 mg In 200  85.022





    Empty Bag 1 bag @ Titrate   





    IV .Q0M [facility] Rx#:   





    [id number]   


 


  Oral 100  150


 


  Tube Feeding 470 556 477


 


Output:   


 


  Urine 1490 1225 760


 


Other:   


 


  Voiding Method Indwelling Catheter Indwelling Catheter Indwelling Catheter








                       ABP, PAP, CO, CI - Last Documented











Arterial Blood Pressure        111/71

















- Exam


GENERAL:patient is intubated sedated


HEENT: Pupils are round and equally reacting to light. EOMI. No scleral icterus.

No conjunctival pallor. Normocephalic, atraumatic. No pharyngeal erythema. No 

thyromegaly. 


CARDIOVASCULAR: S1 and S2 present. No murmurs, rubs, or gallops.  Does have 

elevated JVD


-PULMONARY: Diffuse bilateral crackles


ABDOMEN: Soft, nontender, nondistended, normoactive bowel sounds. No palpable 

organomegaly. 


MUSCULOSKELETAL: No joint swelling or deformity. 


EXTREMITIES: No cyanosis, clubbing, or pedal edema. 


NEUROLOGICAL: unable to assess. 


SKIN: No rashes. no petechiae.








- Labs


CBC & Chem 7: 


                                 07/09/20 04:10





                                 07/09/20 04:10


Labs: 


                  Abnormal Lab Results - Last 24 Hours (Table)











  07/02/20 07/04/20 07/08/20 Range/Units





  08:02 18:26 18:03 


 


WBC     (3.8-10.6)  k/uL


 


RBC     (4.30-5.90)  m/uL


 


Hgb     (13.0-17.5)  gm/dL


 


Hct     (39.0-53.0)  %


 


Neutrophils # (Manual)     (1.3-7.7)  k/uL


 


Monocytes # (Manual)     (0-1.0)  k/uL


 


Metamyelocytes # (Man)     (0)  k/uL


 


Myelocytes # (Manual)     (0)  k/uL


 


Nucleated RBCs     (0-0)  /100 WBC


 


APTT     (22.0-30.0)  sec


 


ABG pCO2     (35-45)  mmHg


 


ABG HCO3     (21-25)  mmol/L


 


Sodium     (137-145)  mmol/L


 


Potassium     (3.5-5.1)  mmol/L


 


Carbon Dioxide     (22-30)  mmol/L


 


BUN     (9-20)  mg/dL


 


Creatinine     (0.66-1.25)  mg/dL


 


Glucose     (74-99)  mg/dL


 


POC Glucose (mg/dL)    184 H  (75-99)  mg/dL


 


Plasma Lactic Acid Francis   3.8 H*   (0.7-2.0)  mmol/L


 


Calcium     (8.4-10.2)  mg/dL


 


Magnesium     (1.6-2.3)  mg/dL


 


AST     (17-59)  U/L


 


ALT     (4-49)  U/L


 


Alkaline Phosphatase     ()  U/L


 


Total Creatine Kinase  2933 H*    ()  U/L


 


CK-MB (CK-2)  127.0 H    (0.0-2.4)  ng/mL


 


Troponin I  70.300 H*    (0.000-0.034)  ng/mL


 


Total Protein     (6.3-8.2)  g/dL


 


Albumin     (3.5-5.0)  g/dL














  07/08/20 07/09/20 07/09/20 Range/Units





  21:15 00:16 04:10 


 


WBC    22.8 H  (3.8-10.6)  k/uL


 


RBC    3.51 L  (4.30-5.90)  m/uL


 


Hgb    11.7 L  (13.0-17.5)  gm/dL


 


Hct    35.0 L  (39.0-53.0)  %


 


Neutrophils # (Manual)    18.90 H  (1.3-7.7)  k/uL


 


Monocytes # (Manual)    1.14 H  (0-1.0)  k/uL


 


Metamyelocytes # (Man)    0.46 H  (0)  k/uL


 


Myelocytes # (Manual)    0.46 H  (0)  k/uL


 


Nucleated RBCs    3 H  (0-0)  /100 WBC


 


APTT  82.5 H    (22.0-30.0)  sec


 


ABG pCO2     (35-45)  mmHg


 


ABG HCO3     (21-25)  mmol/L


 


Sodium     (137-145)  mmol/L


 


Potassium     (3.5-5.1)  mmol/L


 


Carbon Dioxide     (22-30)  mmol/L


 


BUN     (9-20)  mg/dL


 


Creatinine     (0.66-1.25)  mg/dL


 


Glucose     (74-99)  mg/dL


 


POC Glucose (mg/dL)   174 H   (75-99)  mg/dL


 


Plasma Lactic Acid Francis     (0.7-2.0)  mmol/L


 


Calcium     (8.4-10.2)  mg/dL


 


Magnesium     (1.6-2.3)  mg/dL


 


AST     (17-59)  U/L


 


ALT     (4-49)  U/L


 


Alkaline Phosphatase     ()  U/L


 


Total Creatine Kinase     ()  U/L


 


CK-MB (CK-2)     (0.0-2.4)  ng/mL


 


Troponin I     (0.000-0.034)  ng/mL


 


Total Protein     (6.3-8.2)  g/dL


 


Albumin     (3.5-5.0)  g/dL














  07/09/20 07/09/20 07/09/20 Range/Units





  04:10 04:10 05:19 


 


WBC     (3.8-10.6)  k/uL


 


RBC     (4.30-5.90)  m/uL


 


Hgb     (13.0-17.5)  gm/dL


 


Hct     (39.0-53.0)  %


 


Neutrophils # (Manual)     (1.3-7.7)  k/uL


 


Monocytes # (Manual)     (0-1.0)  k/uL


 


Metamyelocytes # (Man)     (0)  k/uL


 


Myelocytes # (Manual)     (0)  k/uL


 


Nucleated RBCs     (0-0)  /100 WBC


 


APTT  91.1 H    (22.0-30.0)  sec


 


ABG pCO2    31 L  (35-45)  mmHg


 


ABG HCO3    20 L  (21-25)  mmol/L


 


Sodium   133 L   (137-145)  mmol/L


 


Potassium   3.4 L   (3.5-5.1)  mmol/L


 


Carbon Dioxide   20 L   (22-30)  mmol/L


 


BUN   78 H   (9-20)  mg/dL


 


Creatinine   1.87 H   (0.66-1.25)  mg/dL


 


Glucose   175 H   (74-99)  mg/dL


 


POC Glucose (mg/dL)     (75-99)  mg/dL


 


Plasma Lactic Acid Francis     (0.7-2.0)  mmol/L


 


Calcium   7.3 L   (8.4-10.2)  mg/dL


 


Magnesium   3.5 H   (1.6-2.3)  mg/dL


 


AST   970 H   (17-59)  U/L


 


ALT   862 H   (4-49)  U/L


 


Alkaline Phosphatase   136 H   ()  U/L


 


Total Creatine Kinase     ()  U/L


 


CK-MB (CK-2)     (0.0-2.4)  ng/mL


 


Troponin I     (0.000-0.034)  ng/mL


 


Total Protein   5.2 L   (6.3-8.2)  g/dL


 


Albumin   2.8 L   (3.5-5.0)  g/dL














  07/09/20 07/09/20 07/09/20 Range/Units





  06:09 10:26 12:05 


 


WBC     (3.8-10.6)  k/uL


 


RBC     (4.30-5.90)  m/uL


 


Hgb     (13.0-17.5)  gm/dL


 


Hct     (39.0-53.0)  %


 


Neutrophils # (Manual)     (1.3-7.7)  k/uL


 


Monocytes # (Manual)     (0-1.0)  k/uL


 


Metamyelocytes # (Man)     (0)  k/uL


 


Myelocytes # (Manual)     (0)  k/uL


 


Nucleated RBCs     (0-0)  /100 WBC


 


APTT   82.2 H   (22.0-30.0)  sec


 


ABG pCO2     (35-45)  mmHg


 


ABG HCO3     (21-25)  mmol/L


 


Sodium     (137-145)  mmol/L


 


Potassium     (3.5-5.1)  mmol/L


 


Carbon Dioxide     (22-30)  mmol/L


 


BUN     (9-20)  mg/dL


 


Creatinine     (0.66-1.25)  mg/dL


 


Glucose     (74-99)  mg/dL


 


POC Glucose (mg/dL)  198 H   197 H  (75-99)  mg/dL


 


Plasma Lactic Acid Francis     (0.7-2.0)  mmol/L


 


Calcium     (8.4-10.2)  mg/dL


 


Magnesium     (1.6-2.3)  mg/dL


 


AST     (17-59)  U/L


 


ALT     (4-49)  U/L


 


Alkaline Phosphatase     ()  U/L


 


Total Creatine Kinase     ()  U/L


 


CK-MB (CK-2)     (0.0-2.4)  ng/mL


 


Troponin I     (0.000-0.034)  ng/mL


 


Total Protein     (6.3-8.2)  g/dL


 


Albumin     (3.5-5.0)  g/dL














  07/09/20 Range/Units





  14:51 


 


WBC   (3.8-10.6)  k/uL


 


RBC   (4.30-5.90)  m/uL


 


Hgb   (13.0-17.5)  gm/dL


 


Hct   (39.0-53.0)  %


 


Neutrophils # (Manual)   (1.3-7.7)  k/uL


 


Monocytes # (Manual)   (0-1.0)  k/uL


 


Metamyelocytes # (Man)   (0)  k/uL


 


Myelocytes # (Manual)   (0)  k/uL


 


Nucleated RBCs   (0-0)  /100 WBC


 


APTT   (22.0-30.0)  sec


 


ABG pCO2   (35-45)  mmHg


 


ABG HCO3   (21-25)  mmol/L


 


Sodium   (137-145)  mmol/L


 


Potassium   (3.5-5.1)  mmol/L


 


Carbon Dioxide   (22-30)  mmol/L


 


BUN   (9-20)  mg/dL


 


Creatinine   (0.66-1.25)  mg/dL


 


Glucose   (74-99)  mg/dL


 


POC Glucose (mg/dL)  233 H  (75-99)  mg/dL


 


Plasma Lactic Acid Francis   (0.7-2.0)  mmol/L


 


Calcium   (8.4-10.2)  mg/dL


 


Magnesium   (1.6-2.3)  mg/dL


 


AST   (17-59)  U/L


 


ALT   (4-49)  U/L


 


Alkaline Phosphatase   ()  U/L


 


Total Creatine Kinase   ()  U/L


 


CK-MB (CK-2)   (0.0-2.4)  ng/mL


 


Troponin I   (0.000-0.034)  ng/mL


 


Total Protein   (6.3-8.2)  g/dL


 


Albumin   (3.5-5.0)  g/dL














Assessment and Plan


Plan: 


-STEMI status post cardiac cath and stent placement in the RCA


Acute hypoxic respiratory failure secondary to congestive heart failure acute 

systolic dysfunction with the acute exacerbation, patient is presently intubated

patient was intubated on 07/07/2020


-Asystole, cardio pulmonary arrest and asystole for 7 minutes on 06/06/2020 

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


-Cardiac shock: Continue with the dobutamine,patient is also on Levophed drip


-Elevated liver enzymes secondary to shock liver improving now


-New-onset atrial fibrillation continue with heparin continue with beta blocker 

patient was started on amiodarone if he goes back into A. fib,


Acute kidney injury prerenal azotemia secondary to heart failure exacerbation


-Hypervolemic hyponatremia secondary to heart failure


Hypotension


Fall, with no dizziness or syncope


-Patient is on levofloxacin because of possibly of pneumonia


Hypertension


Hyperlipidemia


History of prostate cancer status post radiotherapy

## 2020-07-09 NOTE — P.PN
Subjective


Progress Note Date: 07/09/20


This is a 79-year-old gentleman with history of hypertension who presented with 

inferior wall myocardial infarction.  Patient had a cardiac catheterization and 

stent placement of the mid RCA.  His CPK was high in the troponin was in the 

range of 77 on admission.  His echo Cardigan showed an ejection fraction of 45%.

 Patient is complaining of some congestion and seemed to be mild short of 

breath.  Chest x-ray showed evidence of congestive heart failure.  Heart 

appeared to be regular.  No peripheral edema.  I'm going to start him on IV 

Lasix 40 mg and start him by mouth Lasix.  We'll also give potassium supplement.

 Rest of the medication to be continued.  We'll continue follow his input and 

output.  Follow blood work tomorrow.  Further condition depend upon the clinical

course.  We'll continue to monitor him in intensive care unit








07/04/2020: This patient is admitted to the hospital with inferior wall MI, 

probably subacute.  Patient had stent placement.  His eldest was an ejection 

fraction of 40% to 45% with hypokinesis of the inferior wall.  Patient has been 

short of breath.  A chest x-ray showed some evidence of possible pulmonary edema

and possible atypical pneumonia involving the right lower lobe.  Patient does 

have white count elevation.  Clinically he does have a slow murmur in the apical

area and also left sternal border.  Rule out possibility of mitral 

regurgitation.  I'm going to repeat the echocardiogram.  His urine output is 

fair.  Pulmonary consult is requested regarding possible pneumonia.





07/05/2020: This patient is a status post subacute inferior wall MI and stent 

placement.  Patient is still complaining of shortness of breath and congestion. 

His lungs show scattered rhonchi and mild wheezing.  Chest x-ray shows bilateral

findings suggestive of pulmonary edema more so on the right side.  Atypical 

pneumonia cannot be excluded.  His blood pressure is running low.  His 

creatinine is also going up.  I'm going to start him on dobutamine 5 mics per 

KG.  Patient is also on Lasix.  Continue rest of the medication.  His repeat 

echocardiogram did not reveal any significant valvular abnormalities.  Patient 

does have systolic murmur.  We'll make consider COURTNEY examination ,if  patient 

doesn't improve promptly





07/06/2020: This patient is status post subacute inferior wall MI and stent 

placement of the RCA.  Patient hasn't been having good urinary output.  He s

eemed to have acute tubular necrosis.  He was started on dobutamine IV.  There 

is some pickup in the urinary output.  Patient comes is not feeling well.  His 

chest x-ray showed some improvement in the vascular congestion.  There is 

infiltrated in the right base which could be pneumonia.  His her empirically on 

antibiotics.  Patient is being followed by nephrology.  We'll continue monitor 

his renal functions.  Further recommendations depend upon critical course.  

Clinically patient has a systolic murmur.  However echocardiogram did not show 

any significant valvular abnormalities.  He patient condition doesn't improve, 

may consider COURTNEY examination.





07/07/2020: This patient is admitted with subacute inferior wall MI and had 

stent placement of the RCA.  Patient also has severe iliac disease bilaterally. 

Patient hasn't been progressing well.  Developed possible pneumonia and CHF and 

also renal failure.  Urine output has been low.  Patient was started on 

dobutamine.  Patient also had intermittent atrial fibrillation.  He was 

initiated on amiodarone yesterday.  Today he developed severe bradycardia and 

cardiac arrest.  Patient subsequently had a temporary pacemaker implantation.  

He still remains hypotensive.  He is on dobutamine and we also going to start 

him on Levophed.  Discussed with family about CODE STATUS and extent of care.  

They're going to discuss and decide that they did consent to proceed with 

temporary pacemaker.  Does have systolic murmur.  A repeat echocardiogram also 

showed mild mitral regurgitation.  Aortic valve function is normal.  We'll 

continue current medical therapy.  It.  Prognosis is guarded.  His creatinine is

about 2.67.  Nephrology is also following the case





7/8/2020: This patient is admitted with subacute inferior wall myocardial 

infarction complicated with CHF, renal failure and an episode  of severe 

bradycardia and also cardiac arrest.  Patient also has been having episodes of 

atrial fibrillation.  Currently patient had a, temporary pacemaker.  Patient is 

on IV pressors and also on IV dobutam, along with heparin.  His urine output has

 improved.  His creatinine is stable.  A chest x-ray show bilateral infiltrates 

and pleural effusion.  Will continue current.  Management.  Renal follow-up.  

Prognosis is guarded





07/09/2020: This patient is admitted with subacute inferior wall MI complicated 

by CHF, renal failure and episodes of bradycardia.  Patient had a temporary 

pacemaker insertion.  So far.  Patient hasn't been using it.  Patient's urine 

output is slowly improving.  There is improvement in creatinine level.  He 

continues to have systolic murmur.  A repeat echocardiogram also did not reveal 

any significant valvular abnormality.  May consider COURTNEY examination for further 

evaluation here patient also has significant bilateral iliac disease.  We'll 

continue to monitor his pulses in the legs.  He patient doesn't use the pac

emaker by tomorrow, we'll may turn it off.  Prognosis still guarded





Objective





- Vital Signs


Vital signs: 


                                   Vital Signs











Temp  97.9 F   07/09/20 04:00


 


Pulse  107 H  07/09/20 08:04


 


Resp  16   07/09/20 07:00


 


BP  98/59   07/09/20 07:00


 


Pulse Ox  93 L  07/09/20 07:00








                                 Intake & Output











 07/08/20 07/09/20 07/09/20





 18:59 06:59 18:59


 


Intake Total 7438.441 7204.350 127.441


 


Output Total 1490 1225 75


 


Balance 386.000 242.350 52.441


 


Weight  81.4 kg 


 


Intake:   


 


   516 43


 


    0.9 240 240 20


 


    0.9NS Sheath 240 240 20


 


    Arterial Line 27 36 3


 


  Intake, IV Titration 799.000 395.350 31.441





  Amount   


 


    DOBUTamine DRIP 500 mg In 250  





    Dextrose/Water 1 250ml.   





    bag @ 5 MCG/KG/MIN 11.88   





    mls/hr IV .Q21H3M [facility] Rx#   





    :[id number]   


 


    Heparin Sod,Pork in 0.45% 95 145.350 





    NaCl 25,000 unit In 0.45   





    % NaCl 1 250ml.bag @ 12.5   





    UNITS/KG/HR 9.95 mls/hr   





    IV .Q24H [facility] Rx#:   





    [id number]   


 


    Norepinephrine 32 mg In  250 31.441





    Sodium Chloride 0.9% 218   





    ml @ 0.65 MCG/KG/MIN 26.   





    843 mls/hr IV .Q9H19M [facility]   





    Rx#:[id number]   


 


    Norepinephrine 4 mg In 254.000  





    Sodium Chloride 0.9% 250   





    ml @ 0.05 MCG/KG/MIN 14.   





    173 mls/hr IV .T01J35R   





    [facility] Rx#:[id number]   


 


    Propofol 1,000 mg In 200  





    Empty Bag 1 bag @ Titrate   





    IV .Q0M [facility] Rx#:   





    [id number]   


 


  Oral 100  


 


  Tube Feeding 470 556 53


 


Output:   


 


  Urine 1490 1225 75


 


Other:   


 


  Voiding Method Indwelling Catheter Indwelling Catheter 








                       ABP, PAP, CO, CI - Last Documented











Arterial Blood Pressure        82/51

















- Exam





GENERAL EXAM: Patient is intubated and sedated


HEENT: Normocephalic. Normal reaction of pupils, equal size, normal range of 

extraocular motion. No erythema or exudates in the throat.


NECK: No masses, no nuchal rigidity.


CHEST: No chest wall deformity.


LUNGS: Scattered rhonchi and wheezes


HEART: S1, S2 heard.  Systolic murmur heard at the apex and left sternal border


ABDOMEN: No hepatosplenomegaly, normal bowel sounds, no guarding or rigidity.


SKIN: No rashes


CENTRAL NERVOUS SYSTEM: No focal deficits.


EXTREMITIES: No cyanosis, clubbing or edema.





- Labs


CBC & Chem 7: 


                                 07/09/20 04:10





                                 07/09/20 04:10


Labs: 


                  Abnormal Lab Results - Last 24 Hours (Table)











  07/02/20 07/04/20 07/08/20 Range/Units





  08:02 18:26 11:23 


 


WBC     (3.8-10.6)  k/uL


 


RBC     (4.30-5.90)  m/uL


 


Hgb     (13.0-17.5)  gm/dL


 


Hct     (39.0-53.0)  %


 


Neutrophils # (Manual)     (1.3-7.7)  k/uL


 


Monocytes # (Manual)     (0-1.0)  k/uL


 


Metamyelocytes # (Man)     (0)  k/uL


 


Myelocytes # (Manual)     (0)  k/uL


 


Nucleated RBCs     (0-0)  /100 WBC


 


APTT    73.4 H  (22.0-30.0)  sec


 


ABG pCO2     (35-45)  mmHg


 


ABG HCO3     (21-25)  mmol/L


 


Sodium     (137-145)  mmol/L


 


Potassium     (3.5-5.1)  mmol/L


 


Carbon Dioxide     (22-30)  mmol/L


 


BUN     (9-20)  mg/dL


 


Creatinine     (0.66-1.25)  mg/dL


 


Glucose     (74-99)  mg/dL


 


POC Glucose (mg/dL)     (75-99)  mg/dL


 


Plasma Lactic Acid Francis   3.8 H*   (0.7-2.0)  mmol/L


 


Calcium     (8.4-10.2)  mg/dL


 


Magnesium     (1.6-2.3)  mg/dL


 


AST     (17-59)  U/L


 


ALT     (4-49)  U/L


 


Alkaline Phosphatase     ()  U/L


 


Total Creatine Kinase  2933 H*    ()  U/L


 


CK-MB (CK-2)  127.0 H    (0.0-2.4)  ng/mL


 


Troponin I  70.300 H*    (0.000-0.034)  ng/mL


 


Total Protein     (6.3-8.2)  g/dL


 


Albumin     (3.5-5.0)  g/dL














  07/08/20 07/08/20 07/08/20 Range/Units





  11:44 18:03 21:15 


 


WBC     (3.8-10.6)  k/uL


 


RBC     (4.30-5.90)  m/uL


 


Hgb     (13.0-17.5)  gm/dL


 


Hct     (39.0-53.0)  %


 


Neutrophils # (Manual)     (1.3-7.7)  k/uL


 


Monocytes # (Manual)     (0-1.0)  k/uL


 


Metamyelocytes # (Man)     (0)  k/uL


 


Myelocytes # (Manual)     (0)  k/uL


 


Nucleated RBCs     (0-0)  /100 WBC


 


APTT    82.5 H  (22.0-30.0)  sec


 


ABG pCO2     (35-45)  mmHg


 


ABG HCO3     (21-25)  mmol/L


 


Sodium     (137-145)  mmol/L


 


Potassium     (3.5-5.1)  mmol/L


 


Carbon Dioxide     (22-30)  mmol/L


 


BUN     (9-20)  mg/dL


 


Creatinine     (0.66-1.25)  mg/dL


 


Glucose     (74-99)  mg/dL


 


POC Glucose (mg/dL)  193 H  184 H   (75-99)  mg/dL


 


Plasma Lactic Acid Francis     (0.7-2.0)  mmol/L


 


Calcium     (8.4-10.2)  mg/dL


 


Magnesium     (1.6-2.3)  mg/dL


 


AST     (17-59)  U/L


 


ALT     (4-49)  U/L


 


Alkaline Phosphatase     ()  U/L


 


Total Creatine Kinase     ()  U/L


 


CK-MB (CK-2)     (0.0-2.4)  ng/mL


 


Troponin I     (0.000-0.034)  ng/mL


 


Total Protein     (6.3-8.2)  g/dL


 


Albumin     (3.5-5.0)  g/dL














  07/09/20 07/09/20 07/09/20 Range/Units





  00:16 04:10 04:10 


 


WBC   22.8 H   (3.8-10.6)  k/uL


 


RBC   3.51 L   (4.30-5.90)  m/uL


 


Hgb   11.7 L   (13.0-17.5)  gm/dL


 


Hct   35.0 L   (39.0-53.0)  %


 


Neutrophils # (Manual)   18.90 H   (1.3-7.7)  k/uL


 


Monocytes # (Manual)   1.14 H   (0-1.0)  k/uL


 


Metamyelocytes # (Man)   0.46 H   (0)  k/uL


 


Myelocytes # (Manual)   0.46 H   (0)  k/uL


 


Nucleated RBCs   3 H   (0-0)  /100 WBC


 


APTT    91.1 H  (22.0-30.0)  sec


 


ABG pCO2     (35-45)  mmHg


 


ABG HCO3     (21-25)  mmol/L


 


Sodium     (137-145)  mmol/L


 


Potassium     (3.5-5.1)  mmol/L


 


Carbon Dioxide     (22-30)  mmol/L


 


BUN     (9-20)  mg/dL


 


Creatinine     (0.66-1.25)  mg/dL


 


Glucose     (74-99)  mg/dL


 


POC Glucose (mg/dL)  174 H    (75-99)  mg/dL


 


Plasma Lactic Acid Francis     (0.7-2.0)  mmol/L


 


Calcium     (8.4-10.2)  mg/dL


 


Magnesium     (1.6-2.3)  mg/dL


 


AST     (17-59)  U/L


 


ALT     (4-49)  U/L


 


Alkaline Phosphatase     ()  U/L


 


Total Creatine Kinase     ()  U/L


 


CK-MB (CK-2)     (0.0-2.4)  ng/mL


 


Troponin I     (0.000-0.034)  ng/mL


 


Total Protein     (6.3-8.2)  g/dL


 


Albumin     (3.5-5.0)  g/dL














  07/09/20 07/09/20 07/09/20 Range/Units





  04:10 05:19 06:09 


 


WBC     (3.8-10.6)  k/uL


 


RBC     (4.30-5.90)  m/uL


 


Hgb     (13.0-17.5)  gm/dL


 


Hct     (39.0-53.0)  %


 


Neutrophils # (Manual)     (1.3-7.7)  k/uL


 


Monocytes # (Manual)     (0-1.0)  k/uL


 


Metamyelocytes # (Man)     (0)  k/uL


 


Myelocytes # (Manual)     (0)  k/uL


 


Nucleated RBCs     (0-0)  /100 WBC


 


APTT     (22.0-30.0)  sec


 


ABG pCO2   31 L   (35-45)  mmHg


 


ABG HCO3   20 L   (21-25)  mmol/L


 


Sodium  133 L    (137-145)  mmol/L


 


Potassium  3.4 L    (3.5-5.1)  mmol/L


 


Carbon Dioxide  20 L    (22-30)  mmol/L


 


BUN  78 H    (9-20)  mg/dL


 


Creatinine  1.87 H    (0.66-1.25)  mg/dL


 


Glucose  175 H    (74-99)  mg/dL


 


POC Glucose (mg/dL)    198 H  (75-99)  mg/dL


 


Plasma Lactic Acid Francis     (0.7-2.0)  mmol/L


 


Calcium  7.3 L    (8.4-10.2)  mg/dL


 


Magnesium  3.5 H    (1.6-2.3)  mg/dL


 


AST  970 H    (17-59)  U/L


 


ALT  862 H    (4-49)  U/L


 


Alkaline Phosphatase  136 H    ()  U/L


 


Total Creatine Kinase     ()  U/L


 


CK-MB (CK-2)     (0.0-2.4)  ng/mL


 


Troponin I     (0.000-0.034)  ng/mL


 


Total Protein  5.2 L    (6.3-8.2)  g/dL


 


Albumin  2.8 L    (3.5-5.0)  g/dL














Assessment and Plan


(1) Acute transmural inferior wall MI


Current Visit: Yes   Status: Acute   Code(s): I21.19 - STEMI INVOLVING OTH 

CORONARY ARTERY OF INFERIOR WALL   SNOMED Code(s): 68097467


   





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


Current Visit: Yes   Status: Acute   Code(s): I50.41 - ACUTE COMBINED SYSTOLIC 

AND DIASTOLIC (CONGESTIVE) HRT FAIL   SNOMED Code(s): 285515669318467


   





(3) Essential hypertension


Current Visit: Yes   Status: Acute   Code(s): I10 - ESSENTIAL (PRIMARY) 

HYPERTENSION   SNOMED Code(s): 74990208


   





(4) Cardiac arrest


Current Visit: Yes   Status: Acute   Code(s): I46.9 - CARDIAC ARREST, CAUSE 

UNSPECIFIED   SNOMED Code(s): 645675875


   


Plan: 


Patient seemed to be gradually improving.  His creatinine is improved and urine 

output is improved.  Chest x-ray shows some improvement.  Patient still has 

systolic murmur at the apex.  The etiology of this is not clear.  If necessary, 

may consider COURTNEY examination.  We'll continue current management.  Patient 

hasn't been using pacemaker.  May discontinue pacemaker in 24-48 hours

## 2020-07-09 NOTE — XR
EXAMINATION TYPE: XR chest 1V portable

 

DATE OF EXAM: 7/9/2020

 

Comparison: 7/8/2020

 

Clinical History: 79 year-old male tube placement

 

Findings:

ET tube tip 2.4 cm from the giovanna. NG tube courses below the diaphragm. Either a mediastinal or phuong
cardial drain is present. Left subclavian CVC tip in the right atrium. Heart remains enlarged. Dense 
atherosclerotic calcifications throughout the aorta. Patchy and confluent bilateral airspace disease 
greater in the mid and lower lungs with underlying effusions, right greater than left. Overall simila
r to slightly worsened.

 

 

Impression:

Continued CHF with pulmonary edema, similar to minimally worsened in the interval. Continued moderate
 right and small left pleural effusions.

## 2020-07-10 LAB
ALBUMIN SERPL-MCNC: 2.9 G/DL (ref 3.5–5)
ALP SERPL-CCNC: 125 U/L (ref 38–126)
ALT SERPL-CCNC: 688 U/L (ref 4–49)
ANION GAP SERPL CALC-SCNC: 4 MMOL/L
AST SERPL-CCNC: 629 U/L (ref 17–59)
BUN SERPL-SCNC: 67 MG/DL (ref 9–20)
CALCIUM SPEC-MCNC: 7.7 MG/DL (ref 8.4–10.2)
CELLS COUNTED: 200
CHLORIDE SERPL-SCNC: 104 MMOL/L (ref 98–107)
CO2 BLDA-SCNC: 26 MMOL/L (ref 19–24)
CO2 SERPL-SCNC: 27 MMOL/L (ref 22–30)
EOSINOPHIL # BLD MANUAL: 0.41 K/UL (ref 0–0.7)
ERYTHROCYTE [DISTWIDTH] IN BLOOD BY AUTOMATED COUNT: 3.29 M/UL (ref 4.3–5.9)
ERYTHROCYTE [DISTWIDTH] IN BLOOD: 13.7 % (ref 11.5–15.5)
GLUCOSE BLD-MCNC: 164 MG/DL (ref 75–99)
GLUCOSE BLD-MCNC: 170 MG/DL (ref 75–99)
GLUCOSE BLD-MCNC: 174 MG/DL (ref 75–99)
GLUCOSE BLD-MCNC: 176 MG/DL (ref 75–99)
GLUCOSE BLD-MCNC: 182 MG/DL (ref 75–99)
GLUCOSE BLD-MCNC: 184 MG/DL (ref 75–99)
GLUCOSE SERPL-MCNC: 174 MG/DL (ref 74–99)
HCO3 BLDA-SCNC: 23 MMOL/L (ref 21–25)
HCO3 BLDA-SCNC: 25 MMOL/L (ref 21–25)
HCT VFR BLD AUTO: 33.4 % (ref 39–53)
HGB BLD-MCNC: 11.1 GM/DL (ref 13–17.5)
LYMPHOCYTES # BLD MANUAL: 2.87 K/UL (ref 1–4.8)
MCH RBC QN AUTO: 33.8 PG (ref 25–35)
MCHC RBC AUTO-ENTMCNC: 33.3 G/DL (ref 31–37)
MCV RBC AUTO: 101.5 FL (ref 80–100)
METAMYELOCYTES # BLD: 0.41 K/UL
MONOCYTES # BLD MANUAL: 1.03 K/UL (ref 0–1)
NEUTROPHILS NFR BLD MANUAL: 73 %
NEUTS SEG # BLD MANUAL: 16.1 K/UL (ref 1.3–7.7)
PCO2 BLDA: 35 MMHG (ref 35–45)
PCO2 BLDA: 41 MMHG (ref 35–45)
PH BLDA: 7.4 [PH] (ref 7.35–7.45)
PH BLDA: 7.44 [PH] (ref 7.35–7.45)
PLATELET # BLD AUTO: 265 K/UL (ref 150–450)
PO2 BLDA: 85 MMHG (ref 83–108)
PO2 BLDA: 90 MMHG (ref 83–108)
POTASSIUM SERPL-SCNC: 3.9 MMOL/L (ref 3.5–5.1)
PROT SERPL-MCNC: 5.8 G/DL (ref 6.3–8.2)
SAO2 % BLDA: 45.2 %
SAO2 % BLDA: 45.7 %
SAO2 % BLDA: 75.7 %
SAO2 % BLDA: 79.9 %
SODIUM SERPL-SCNC: 135 MMOL/L (ref 137–145)
WBC # BLD AUTO: 20.5 K/UL (ref 3.8–10.6)

## 2020-07-10 PROCEDURE — B24BZZ4 ULTRASONOGRAPHY OF HEART WITH AORTA, TRANSESOPHAGEAL: ICD-10-PCS

## 2020-07-10 RX ADMIN — PANTOPRAZOLE SODIUM SCH MG: 40 INJECTION, POWDER, FOR SOLUTION INTRAVENOUS at 10:57

## 2020-07-10 RX ADMIN — PROPOFOL SCH MLS/HR: 10 INJECTION, EMULSION INTRAVENOUS at 17:19

## 2020-07-10 RX ADMIN — PROPOFOL SCH MLS/HR: 10 INJECTION, EMULSION INTRAVENOUS at 11:32

## 2020-07-10 RX ADMIN — IPRATROPIUM BROMIDE AND ALBUTEROL SULFATE SCH ML: .5; 3 SOLUTION RESPIRATORY (INHALATION) at 23:30

## 2020-07-10 RX ADMIN — IPRATROPIUM BROMIDE AND ALBUTEROL SULFATE SCH ML: .5; 3 SOLUTION RESPIRATORY (INHALATION) at 11:31

## 2020-07-10 RX ADMIN — IPRATROPIUM BROMIDE AND ALBUTEROL SULFATE SCH: .5; 3 SOLUTION RESPIRATORY (INHALATION) at 15:44

## 2020-07-10 RX ADMIN — PROPOFOL SCH MLS/HR: 10 INJECTION, EMULSION INTRAVENOUS at 05:39

## 2020-07-10 RX ADMIN — PROPOFOL SCH MLS/HR: 10 INJECTION, EMULSION INTRAVENOUS at 23:50

## 2020-07-10 RX ADMIN — NOREPINEPHRINE BITARTRATE SCH MLS/HR: 1 INJECTION, SOLUTION, CONCENTRATE INTRAVENOUS at 17:18

## 2020-07-10 RX ADMIN — CHLORHEXIDINE GLUCONATE SCH ML: 1.2 RINSE ORAL at 20:26

## 2020-07-10 RX ADMIN — IPRATROPIUM BROMIDE AND ALBUTEROL SULFATE SCH ML: .5; 3 SOLUTION RESPIRATORY (INHALATION) at 07:30

## 2020-07-10 RX ADMIN — INSULIN ASPART SCH UNIT: 100 INJECTION, SOLUTION INTRAVENOUS; SUBCUTANEOUS at 18:47

## 2020-07-10 RX ADMIN — IPRATROPIUM BROMIDE AND ALBUTEROL SULFATE SCH ML: .5; 3 SOLUTION RESPIRATORY (INHALATION) at 03:20

## 2020-07-10 RX ADMIN — INSULIN ASPART SCH UNIT: 100 INJECTION, SOLUTION INTRAVENOUS; SUBCUTANEOUS at 05:49

## 2020-07-10 RX ADMIN — INSULIN ASPART SCH UNIT: 100 INJECTION, SOLUTION INTRAVENOUS; SUBCUTANEOUS at 23:53

## 2020-07-10 RX ADMIN — ATORVASTATIN CALCIUM SCH MG: 80 TABLET, FILM COATED ORAL at 20:26

## 2020-07-10 RX ADMIN — PROPOFOL SCH MLS/HR: 10 INJECTION, EMULSION INTRAVENOUS at 01:35

## 2020-07-10 RX ADMIN — CHLORHEXIDINE GLUCONATE SCH ML: 1.2 RINSE ORAL at 10:58

## 2020-07-10 RX ADMIN — CLOPIDOGREL BISULFATE SCH MG: 75 TABLET ORAL at 10:58

## 2020-07-10 RX ADMIN — HEPARIN SODIUM SCH: 10000 INJECTION, SOLUTION INTRAVENOUS at 14:01

## 2020-07-10 RX ADMIN — IPRATROPIUM BROMIDE AND ALBUTEROL SULFATE SCH ML: .5; 3 SOLUTION RESPIRATORY (INHALATION) at 19:24

## 2020-07-10 RX ADMIN — INSULIN ASPART SCH UNIT: 100 INJECTION, SOLUTION INTRAVENOUS; SUBCUTANEOUS at 14:00

## 2020-07-10 NOTE — P.GSCN
History of Present Illness


Consult date: 07/10/20


Reason for Consult: 





Ruptured posterior wall of the left ventricle with pseudoaneurysm


Requesting physician: Guillermo Ornelas


History of present illness: 





This is a 79-year-old gentleman who follows on an outpatient basis with Dr. Melo.  He has a previous medical history of coronary artery disease, 

hypertension, hyperlipidemia, prostate cancer with radiation, dementia, and 

previous tobacco dependence.  This gentleman presented to the emergency room at 

[facility] on 07/02/2020 with complaints of left-sided chest pain at 

rest, associated with nausea and diaphoresis.  Patient was found to have ST 

elevation in the inferior leads.  Troponin was elevated at 70.3 and patient was 

diagnosed with acute inferior wall myocardial infarction.  He was taken to the 

cardiac catheterization lab where a stent was placed to the mid RCA.  

Subsequently a transthoracic echocardiogram was completed demonstrating mildly 

impaired left ventricular systolic function with EF 45-50%, hypokinesis of the 

left ventricle, trace mitral regurgitation, mild tricuspid regurgitation with 

mild pulmonary hypertension.  Chest x-ray was completed on admission 

demonstrating interstitial alveolar edema, follow-up chest x-rays demonstrated 

increasing interstitial edema and the patient's oxygen requirements continued to

rise.  He was seen by the intensivists in the ICU, was felt to have pulmonary 

edema secondary to acute systolic heart failure, as well as possible right lower

lobe pneumonia and was started on IV antibiotics as well as diuretics.  On 

07/07/2020 the patient went bradycardic and then had a cardiac arrest.  A temp

orary transvenous pacemaker was implanted.  He was intubated.  He continued to 

have a systolic murmur and repeat echocardiograms were completed without 

demonstration of significant valvular pathology to account for the murmur.  This

morning he had a transesophageal echocardiogram completed by Dr. Ornelas 

which Dr. Ornelas felt showed a ruptured posterior lateral wall with 

pseudoaneurysm present.  Due to this finding Dr. Mart from cardiothoracic 

surgery was consulted for recommendations.  Of note, history was obtained from 

the chart and Dr. Ornelas as the patient is currently intubated with no 

family present.





Review of Systems


ROS unobtainable: due to endotracheal tube





Past Medical History


Past Medical History: Unable to Obtain, Coronary Artery Disease (CAD), Dementia,

Myocardial Infarction (MI)


Additional Past Medical History / Comment(s): Prostate cancer with radiation 

treatments, benign colon polyps, bilateral hearing aides.


History of Any Multi-Drug Resistant Organisms: None Reported


Past Surgical History: Unable to Obtain, Heart Catheterization With Stent


Additional Past Surgical History / Comment(s): Cystoscopy, colonoscopy with 

benign polypectomy, EGD as a child for foreign object, R elbow fracture with 

surgery, L hand injury with surgery.


Past Anesthesia/Blood Transfusion Reactions: No Reported Reaction


Date of Last Stent Placement:: 7/2/20


Past Psychological History: No Psychological Hx Reported


Smoking Status: Never smoker


Past Alcohol Use History: Daily


Past Drug Use History: None Reported





- Past Family History


  ** Father


Additional Family Medical History / Comment(s): Father was an alcoholic.





  ** Mother


Additional Family Medical History / Comment(s): Mother was an alcoholic





Medications and Allergies


                                Home Medications











 Medication  Instructions  Recorded  Confirmed  Type


 


Atorvastatin Calcium [Lipitor] 20 mg PO HS 07/02/20 07/02/20 History


 


Beet Root 1000mg 2,000 mg PO DAILY 07/02/20 07/02/20 History


 


Cholecalciferol [Vitamin D3 (25 5,000 unit PO DAILY 07/02/20 07/02/20 History





Mcg = 1000 Iu)]    


 


Citalopram Hydrobromide [CeleXA] 20 mg PO DAILY 07/02/20 07/02/20 History


 


Donepezil [Aricept] 10 mg PO DAILY 07/02/20 07/02/20 History


 


Magnesium Oxide [Mag-Ox] 800 mg PO DAILY 07/02/20 07/02/20 History


 


Memantine HCl [Memantine HCl ER] 28 mg PO DAILY 07/02/20 07/02/20 History


 


Tamsulosin HCl [Flomax] 0.4 mg PO DAILY 07/02/20 07/02/20 History


 


Vitamin B Complex 1 tab PO DAILY 07/02/20 07/02/20 History


 


amLODIPine [Norvasc] 5 mg PO DAILY 07/02/20 07/02/20 History


 


traZODone  mg PO HS 07/02/20 07/02/20 History








                                    Allergies











Allergy/AdvReac Type Severity Reaction Status Date / Time


 


Penicillins Allergy  Rash/Hives Verified 07/02/20 11:16














Surgical - Exam


                                   Vital Signs











Temp Pulse Resp BP Pulse Ox


 


 98.4 F   71   18   108/77   94 L


 


 07/02/20 07:57  07/02/20 07:57  07/02/20 07:57  07/02/20 07:57  07/02/20 07:57














- General





Currently intubated and sedated


well developed, well nourished, no distress





- Respiratory





Lungs sounds diminished bilaterally.  Respirations even, nonlabored on 

mechanical ventilation.  Current ventilator settings assist control mode, tidal 

volume 450, FiO2 50%, respiratory rate 12, PEEP 5.  ABGs this morning on those 

settings 7.4/41/90/25.  8.0 ET tube present, 23 at the lip.





- Cardiovascular





S1, S2 present, loud systolic murmur present.  Regular rate and rhythm, sinus 

rhythm on telemetry.  Palpable peripheral pulses bilaterally.  No edema present.

  No calf pain or tenderness noted.  Left subclavian triple-lumen central line, 

left foot a line, right femoral transvenous pacemaker present, pacemaker 

connected to generator with VVI mode and backup rate 50 bpm.  Currently on IV 

dobutamine and Levophed.





- Abdomen


Abdomen: soft, non tender, bowel sounds





- Genitourinary





Calderon present draining clear, yellow urine.  Outputs 40-60 mL per hour





- Rectum





Deferred





- Integumentary


no rash, no growths





- Neurologic





Currently sedated with propofol, off sedation patient follows commands and moves

 all extremities per nursing





Results





- Labs





                                 07/10/20 04:15





                                 07/10/20 04:15


                  Abnormal Lab Results - Last 24 Hours (Table)











  07/09/20 07/09/20 07/09/20 Range/Units





  10:26 12:05 14:51 


 


WBC     (3.8-10.6)  k/uL


 


RBC     (4.30-5.90)  m/uL


 


Hgb     (13.0-17.5)  gm/dL


 


Hct     (39.0-53.0)  %


 


MCV     (80.0-100.0)  fL


 


Neutrophils # (Manual)     (1.3-7.7)  k/uL


 


Monocytes # (Manual)     (0-1.0)  k/uL


 


Metamyelocytes # (Man)     (0)  k/uL


 


Nucleated RBCs     (0-0)  /100 WBC


 


APTT  82.2 H    (22.0-30.0)  sec


 


ABG Total CO2     (19-24)  mmol/L


 


Sodium     (137-145)  mmol/L


 


BUN     (9-20)  mg/dL


 


Creatinine     (0.66-1.25)  mg/dL


 


Glucose     (74-99)  mg/dL


 


POC Glucose (mg/dL)   197 H  233 H  (75-99)  mg/dL


 


Calcium     (8.4-10.2)  mg/dL


 


AST     (17-59)  U/L


 


ALT     (4-49)  U/L


 


Total Protein     (6.3-8.2)  g/dL


 


Albumin     (3.5-5.0)  g/dL














  07/09/20 07/09/20 07/09/20 Range/Units





  18:55 19:00 23:42 


 


WBC     (3.8-10.6)  k/uL


 


RBC     (4.30-5.90)  m/uL


 


Hgb     (13.0-17.5)  gm/dL


 


Hct     (39.0-53.0)  %


 


MCV     (80.0-100.0)  fL


 


Neutrophils # (Manual)     (1.3-7.7)  k/uL


 


Monocytes # (Manual)     (0-1.0)  k/uL


 


Metamyelocytes # (Man)     (0)  k/uL


 


Nucleated RBCs     (0-0)  /100 WBC


 


APTT   64.6 H   (22.0-30.0)  sec


 


ABG Total CO2     (19-24)  mmol/L


 


Sodium     (137-145)  mmol/L


 


BUN     (9-20)  mg/dL


 


Creatinine     (0.66-1.25)  mg/dL


 


Glucose     (74-99)  mg/dL


 


POC Glucose (mg/dL)  204 H   171 H  (75-99)  mg/dL


 


Calcium     (8.4-10.2)  mg/dL


 


AST     (17-59)  U/L


 


ALT     (4-49)  U/L


 


Total Protein     (6.3-8.2)  g/dL


 


Albumin     (3.5-5.0)  g/dL














  07/10/20 07/10/20 07/10/20 Range/Units





  04:15 04:15 04:15 


 


WBC   20.5 H   (3.8-10.6)  k/uL


 


RBC   3.29 L   (4.30-5.90)  m/uL


 


Hgb   11.1 L   (13.0-17.5)  gm/dL


 


Hct   33.4 L   (39.0-53.0)  %


 


MCV   101.5 H   (80.0-100.0)  fL


 


Neutrophils # (Manual)   16.10 H   (1.3-7.7)  k/uL


 


Monocytes # (Manual)   1.03 H   (0-1.0)  k/uL


 


Metamyelocytes # (Man)   0.41 H   (0)  k/uL


 


Nucleated RBCs   5 H   (0-0)  /100 WBC


 


APTT  64.2 H    (22.0-30.0)  sec


 


ABG Total CO2     (19-24)  mmol/L


 


Sodium    135 L  (137-145)  mmol/L


 


BUN    67 H  (9-20)  mg/dL


 


Creatinine    1.57 H  (0.66-1.25)  mg/dL


 


Glucose    174 H  (74-99)  mg/dL


 


POC Glucose (mg/dL)     (75-99)  mg/dL


 


Calcium    7.7 L  (8.4-10.2)  mg/dL


 


AST    629 H  (17-59)  U/L


 


ALT    688 H  (4-49)  U/L


 


Total Protein    5.8 L  (6.3-8.2)  g/dL


 


Albumin    2.9 L  (3.5-5.0)  g/dL














  07/10/20 07/10/20 Range/Units





  04:42 05:45 


 


WBC    (3.8-10.6)  k/uL


 


RBC    (4.30-5.90)  m/uL


 


Hgb    (13.0-17.5)  gm/dL


 


Hct    (39.0-53.0)  %


 


MCV    (80.0-100.0)  fL


 


Neutrophils # (Manual)    (1.3-7.7)  k/uL


 


Monocytes # (Manual)    (0-1.0)  k/uL


 


Metamyelocytes # (Man)    (0)  k/uL


 


Nucleated RBCs    (0-0)  /100 WBC


 


APTT    (22.0-30.0)  sec


 


ABG Total CO2  26 H   (19-24)  mmol/L


 


Sodium    (137-145)  mmol/L


 


BUN    (9-20)  mg/dL


 


Creatinine    (0.66-1.25)  mg/dL


 


Glucose    (74-99)  mg/dL


 


POC Glucose (mg/dL)   174 H  (75-99)  mg/dL


 


Calcium    (8.4-10.2)  mg/dL


 


AST    (17-59)  U/L


 


ALT    (4-49)  U/L


 


Total Protein    (6.3-8.2)  g/dL


 


Albumin    (3.5-5.0)  g/dL








                                 Diabetes panel











  07/10/20 Range/Units





  04:15 


 


Sodium  135 L  (137-145)  mmol/L


 


Potassium  3.9  (3.5-5.1)  mmol/L


 


Chloride  104  ()  mmol/L


 


Carbon Dioxide  27  (22-30)  mmol/L


 


BUN  67 H  (9-20)  mg/dL


 


Creatinine  1.57 H  (0.66-1.25)  mg/dL


 


Glucose  174 H  (74-99)  mg/dL


 


Calcium  7.7 L  (8.4-10.2)  mg/dL


 


AST  629 H  (17-59)  U/L


 


ALT  688 H  (4-49)  U/L


 


Alkaline Phosphatase  125  ()  U/L


 


Total Protein  5.8 L  (6.3-8.2)  g/dL


 


Albumin  2.9 L  (3.5-5.0)  g/dL








                                  Calcium panel











  07/10/20 Range/Units





  04:15 


 


Calcium  7.7 L  (8.4-10.2)  mg/dL


 


Albumin  2.9 L  (3.5-5.0)  g/dL








                                 Pituitary panel











  07/10/20 Range/Units





  04:15 


 


Sodium  135 L  (137-145)  mmol/L


 


Potassium  3.9  (3.5-5.1)  mmol/L


 


Chloride  104  ()  mmol/L


 


Carbon Dioxide  27  (22-30)  mmol/L


 


BUN  67 H  (9-20)  mg/dL


 


Creatinine  1.57 H  (0.66-1.25)  mg/dL


 


Glucose  174 H  (74-99)  mg/dL


 


Calcium  7.7 L  (8.4-10.2)  mg/dL








                                  Adrenal panel











  07/10/20 Range/Units





  04:15 


 


Sodium  135 L  (137-145)  mmol/L


 


Potassium  3.9  (3.5-5.1)  mmol/L


 


Chloride  104  ()  mmol/L


 


Carbon Dioxide  27  (22-30)  mmol/L


 


BUN  67 H  (9-20)  mg/dL


 


Creatinine  1.57 H  (0.66-1.25)  mg/dL


 


Glucose  174 H  (74-99)  mg/dL


 


Calcium  7.7 L  (8.4-10.2)  mg/dL


 


Total Bilirubin  0.9  (0.2-1.3)  mg/dL


 


AST  629 H  (17-59)  U/L


 


ALT  688 H  (4-49)  U/L


 


Alkaline Phosphatase  125  ()  U/L


 


Total Protein  5.8 L  (6.3-8.2)  g/dL


 


Albumin  2.9 L  (3.5-5.0)  g/dL














- Imaging


Chest x-ray: report reviewed, image reviewed


Additional studies: 





Echocardiogram films revealed





Assessment and Plan


Assessment: 





1.  Acute inferior wall myocardial infarction, status post stent to the mid RCA


2.  Ruptured posterior lateral wall with pseudoaneurysm on transesophageal 

echocardiogram from this morning


3.  Acute systolic heart failure with akinesis of the LV


4.  Status post cardiopulmonary arrest


5.  Acute hypoxemic respiratory failure requiring intubation and mechanical 

ventilation


6.  New-onset paroxysmal atrial fibrillation, currently in normal sinus rhythm


7.  Acute kidney injury


8.  Possible right lower lobe pneumonia


9.  Elevated transaminases


10.  History of hypertension, currently hypotensive on levo


11.  History of hyperlipidemia


12.  History of prostate cancer with radiation


13.  Dementia


14.  Previous tobacco dependence





Plan: 





The patient was seen and examined at the bedside.  A chart/diagnostics were 

reviewed.  The patient was seen and examined by Dr. Mart and his 

transesophageal echocardiogram film as well as transthoracic echocardiograms and

 heart catheterization films were reviewed.  Our recommendation is for no 

surgical intervention at this time as the risk of surgery far exceeds the risk 

of conservative management.  Dr. Mart did attempt to call Dr. Ornelas to 

discuss this plan, our recommendations were discussed with the daughter at the 

bedside.  Patient remains on IV dobutamine and heparin.  Currently on Plavix due

 to recent stent.  Mechanical ventilator per pulmonology, continue sedation.  

Management of other comorbidity conditions per primary care service.  





Thank you Dr. Ornelas for this consult. 


Time with Patient: Greater than 30

## 2020-07-10 NOTE — P.PCN
Date of Procedure: 07/10/20


Preoperative Diagnosis: 


Rule out Cardec shunt


Postoperative Diagnosis: 


The same


Procedure(s) Performed: 


Right heart catheterization using Linden-Tyrese catheter


Description of Procedure: 


Right heart catheterization: The patient was brought to the lab in a fasting 

state.  Patient had existing state in the right femoral vein.  This was 

exchanged to a 8-Maori sheath.  Existing temporary pacemaker was was removed.  

A balloon-tip Linden-Tyrese catheter was advanced under fluoroscopy into the right 

atrium, right ventricle and pulmonary artery.  Hemodynamics in the blood gas 

were obtained.  Patient tolerated the procedure well.  The Linden-Tyrese catheter 

was subsequently removed.  Temporary pacemaker wire [new] was advanced under 

fluoroscopy and was placed in the right ventricle apical region.  Satisfactory 

position was obtained.  Thresholds were excellent..  The pacemaker is set at a 

rate of 50 or and output of 3.  





Findings: Right atrial pressure was 16.  Right ventricular pressure was 45/16.  

Pulmonary artery pressure was 45/20.  Pulmonary wedge pressure was 20.  Cardiac 

output is 6.8 L.  Blood gas are pending at this time





Final impression #1.  Right heart catheterization #2.  Removal of old pacemaker 

wire #3.  Insertion of the new pacemaker wire.





Plan: Patient will be transferred to intensive care unit.  Continue current 

management.

## 2020-07-10 NOTE — P.PN
Subjective





79 years old male with past medical history of hypertension, hyperlipidemia, 

dementia, prostate cancer status post radiotherapy , patient presents because of

chest pain found to have STEMI, he underwent cardiac cath status post stent 

placement in the right coronary artery.  He is in the ICU.


  His creatinine increased today to 1.4, WBC is high at 18.6 and 19.6 K, sodium 

131, his chest x-ray showing pulmonary congestion with possible interstitial 

pneumonia


He saturating 90s on 7 L/m oxygen via nasal cannula.


Cardiologist already given 1 dose of Lasix IV and continued on 40 mg by mouth 

daily, 1 going to give him another dose of Lasix.  Blood pressure is on the low 

normal but he is tachypneic at 35.  A but that side nurse his breathing is li

ttle better in the evening than in the morning and he did not need BiPAP for 

now.


He is also on aspirin and Plavix, he is on metoprolol 12.5 mg and lisinopril 2.5

mg, we going to hold lisinopril for now


Also call pulmonary and nephrology consult





07/04/2020


Patient breathing easier but however his significantly tachypneic and mid 20s, 

he saturating 94% on 9 L oxygen via nasal cannula.  His blood pressure 96/57 and

heart rate 77.


He still bleeding from dyspnea but no chest pain, he has little cough with clear

phlegm.


This morning he was trying to get out of bed to the restroom by himself when he 

fell on hit his head in the forehead area.


Chest x-ray: Pulmonary edema, atypical pneumonia, some right lower lobe 

pneumonia


WBC this morning is 18.1 K with slight improvement.  Sodium 132, creatinine 

stable at 1.5.  Procol stone and is elevated at 0.23


Patient is currently on by mouth Lasix, lisinopril is held, cardiologist 

recommended to continue with metoprolol 12.5, we going to check with cardiology 

about holding parameters.  Continue with Levaquin.


Ordered EKG








7/5/20


Patient awake and alert, his breathing is easier although he still complains 

from some chest pain with little dyspnea and the 10 cough.


He is saturating 94% on 3-5 L of oxygen via nasal cannula.  Rest of Vitas looks 

stable, however her blood pressure is on the low side, this morning was 78/56-

94/59 


WBC is 17.1 K, hemoglobin 12.2, sodium 128, creatinine went up to 2.3.  Lactic 

acid is elevated at 3.8.  ProBNP is 41 500 


sputum culture is pending.  Chest x-ray showing stable changes with diffuse 

parenchymal changes or diffuse pneumonia or CHF/pulmonary edema


Cardiology started the patient on dobutamine drip, also he got 1 dose of Lasix 

60 mg IV 1 and on sodium bicarbonate tablets.


Patient also is on Levaquin 250 mg by mouth daily, which is renal dose.  

Pulmonary nephrology and cardiology teams on the case





07/06/2020


Patient is still complaining of shortness of breath patient has significant 

rhonchi streaking heart failure exacerbation patient is presently on dobutamine 

drip does have atrial fibrillation for which patient is on Cardizem, patient is 

still hyponatremic receiving sodium bicarbonate tablets is also on Lasix, 

patient is on metoprolol as well.  Patient was started on IV heparin.  We will d

yspnea Cardizem as it decreases as it [facility] heart failure.  

Nephrology is following the patient patient probably has hypovolemic 

hyponatremia





007/07/2020


Patient went into asystole after which the patient had a temporary transvenous a

speckled placement , patient was also intubated because of respiratory failure. 

Patient is presently on dopamine drip and also on norepinephrine drip.  Patient 

is presently sinus rhythm.  Patient still has pulmonary edema on the chest x-ray

nephrology is managing diuretics patient serum sodium continues to be low at 

124we creatinine continued to go up to subacute 2.79





July 2020


Patient remains on ventilator support with FiO2 of 50% deep of 5 respiratory set

up respiratory rate of 12.  Blood gases did not show any significant 

abnormality.  Patient remains are not epinephrine 8 mcg/m dobutamine drip 

propofol drip heparin drip presently sinus rhythm, nutrition via NG tube





07/09/2020


No significant change in his overall clinical condition except for some 

improvement in his serum sodium of 133 white blood cell count continued to go up

patient remains on ventilator support patient is on 2 pressors.  Fairly good 

urine output liver enzymes are coming down patient had a sick sedation 

medication today hypokalemia potassium is being replaced





07/10/2020


Patient underwent transesophageal echocardiogram which was concerning for 

ruptured posterior wall.  Because of his cardiothoracic surgery was consulted 

and evaluated the patient and any surgical intervention at this time is more 

harmful than benefit because of which no further intervention is being planned 

at this time although there is overall clinical improvement except for white 

blood cell count which continues to cough serum sodium improved, kidney function

improved, liver enzymes improved, patient remains in a dual pressor support and 

ventilatory support able to ventilate well chest x-ray showing CHF





review of systems: Unable to obtain as patient is intubated and sedated at this 

time





All inpatient medications were reviewed and appropriate changes in these 

medications as dictated in the interval history and assessment and plan.








Objective





- Vital Signs


Vital signs: 


                                   Vital Signs











Temp  98.3 F   07/10/20 12:00


 


Pulse  109 H  07/10/20 12:00


 


Resp  11 L  07/10/20 12:00


 


BP  97/64   07/10/20 12:00


 


Pulse Ox  97   07/10/20 12:00








                                 Intake & Output











 07/09/20 07/10/20 07/10/20





 18:59 06:59 18:59


 


Intake Total 2739.811 9956.333 661.366


 


Output Total 975 675 325


 


Balance 266.312 5321.333 336.366


 


Weight 81.4 kg 83 kg 


 


Intake:   


 


   516 258


 


    0.9 240 240 120


 


    0.9NS Sheath 240 240 120


 


    Arterial Line 36 36 18


 


  Intake, IV Titration 337.279 547.333 227.366





  Amount   


 


    DOBUTamine DRIP 500 mg In  168.696 





    Dextrose/Water 1 250ml.   





    bag @ 5 MCG/KG/MIN 11.88   





    mls/hr IV .Q21H3M [facility] Rx#   





    :[id number]   


 


    Heparin Sod,Pork in 0.45% 63.143 41.507 





    NaCl 25,000 unit In 0.45   





    % NaCl 1 250ml.bag @ 12.5   





    UNITS/KG/HR 9.95 mls/hr   





    IV .Q24H [facility] Rx#:   





    [id number]   


 


    Norepinephrine 32 mg In 156.635 109.169 127.915





    Sodium Chloride 0.9% 218   





    ml @ 0.65 MCG/KG/MIN 26.   





    843 mls/hr IV .Q9H19M [facility]   





    Rx#:[id number]   


 


    Propofol 1,000 mg In 117.501 227.961 99.451





    Empty Bag 1 bag @ Titrate   





    IV .Q0M [facility] Rx#:   





    [id number]   


 


  Oral 150 75 70


 


  Tube Feeding 636 636 106


 


Output:   


 


  Urine 975 675 325


 


Other:   


 


  Voiding Method Indwelling Catheter Indwelling Catheter Indwelling Catheter








                       ABP, PAP, CO, CI - Last Documented











Arterial Blood Pressure        82/57

















- Exam


GENERAL:patient is intubated sedated


HEENT: Pupils are round and equally reacting to light. EOMI. No scleral icterus.

No conjunctival pallor. Normocephalic, atraumatic. No pharyngeal erythema. No 

thyromegaly. 


CARDIOVASCULAR: S1 and S2 present. No murmurs, rubs, or gallops.  Does have 

elevated JVD


-PULMONARY: Diffuse bilateral crackles


ABDOMEN: Soft, nontender, nondistended, normoactive bowel sounds. No palpable 

organomegaly. 


MUSCULOSKELETAL: No joint swelling or deformity. 


EXTREMITIES: No cyanosis, clubbing, or pedal edema. 


NEUROLOGICAL: unable to assess. 


SKIN: No rashes. no petechiae.








- Labs


CBC & Chem 7: 


                                 07/10/20 04:15





                                 07/10/20 04:15


Labs: 


                  Abnormal Lab Results - Last 24 Hours (Table)











  07/09/20 07/09/20 07/09/20 Range/Units





  14:51 18:55 19:00 


 


WBC     (3.8-10.6)  k/uL


 


RBC     (4.30-5.90)  m/uL


 


Hgb     (13.0-17.5)  gm/dL


 


Hct     (39.0-53.0)  %


 


MCV     (80.0-100.0)  fL


 


Neutrophils # (Manual)     (1.3-7.7)  k/uL


 


Monocytes # (Manual)     (0-1.0)  k/uL


 


Metamyelocytes # (Man)     (0)  k/uL


 


Nucleated RBCs     (0-0)  /100 WBC


 


APTT    64.6 H  (22.0-30.0)  sec


 


ABG Total CO2     (19-24)  mmol/L


 


Sodium     (137-145)  mmol/L


 


BUN     (9-20)  mg/dL


 


Creatinine     (0.66-1.25)  mg/dL


 


Glucose     (74-99)  mg/dL


 


POC Glucose (mg/dL)  233 H  204 H   (75-99)  mg/dL


 


Calcium     (8.4-10.2)  mg/dL


 


AST     (17-59)  U/L


 


ALT     (4-49)  U/L


 


Total Protein     (6.3-8.2)  g/dL


 


Albumin     (3.5-5.0)  g/dL














  07/09/20 07/10/20 07/10/20 Range/Units





  23:42 04:15 04:15 


 


WBC    20.5 H  (3.8-10.6)  k/uL


 


RBC    3.29 L  (4.30-5.90)  m/uL


 


Hgb    11.1 L  (13.0-17.5)  gm/dL


 


Hct    33.4 L  (39.0-53.0)  %


 


MCV    101.5 H  (80.0-100.0)  fL


 


Neutrophils # (Manual)    16.10 H  (1.3-7.7)  k/uL


 


Monocytes # (Manual)    1.03 H  (0-1.0)  k/uL


 


Metamyelocytes # (Man)    0.41 H  (0)  k/uL


 


Nucleated RBCs    5 H  (0-0)  /100 WBC


 


APTT   64.2 H   (22.0-30.0)  sec


 


ABG Total CO2     (19-24)  mmol/L


 


Sodium     (137-145)  mmol/L


 


BUN     (9-20)  mg/dL


 


Creatinine     (0.66-1.25)  mg/dL


 


Glucose     (74-99)  mg/dL


 


POC Glucose (mg/dL)  171 H    (75-99)  mg/dL


 


Calcium     (8.4-10.2)  mg/dL


 


AST     (17-59)  U/L


 


ALT     (4-49)  U/L


 


Total Protein     (6.3-8.2)  g/dL


 


Albumin     (3.5-5.0)  g/dL














  07/10/20 07/10/20 07/10/20 Range/Units





  04:15 04:42 05:45 


 


WBC     (3.8-10.6)  k/uL


 


RBC     (4.30-5.90)  m/uL


 


Hgb     (13.0-17.5)  gm/dL


 


Hct     (39.0-53.0)  %


 


MCV     (80.0-100.0)  fL


 


Neutrophils # (Manual)     (1.3-7.7)  k/uL


 


Monocytes # (Manual)     (0-1.0)  k/uL


 


Metamyelocytes # (Man)     (0)  k/uL


 


Nucleated RBCs     (0-0)  /100 WBC


 


APTT     (22.0-30.0)  sec


 


ABG Total CO2   26 H   (19-24)  mmol/L


 


Sodium  135 L    (137-145)  mmol/L


 


BUN  67 H    (9-20)  mg/dL


 


Creatinine  1.57 H    (0.66-1.25)  mg/dL


 


Glucose  174 H    (74-99)  mg/dL


 


POC Glucose (mg/dL)    174 H  (75-99)  mg/dL


 


Calcium  7.7 L    (8.4-10.2)  mg/dL


 


AST  629 H    (17-59)  U/L


 


ALT  688 H    (4-49)  U/L


 


Total Protein  5.8 L    (6.3-8.2)  g/dL


 


Albumin  2.9 L    (3.5-5.0)  g/dL














  07/10/20 07/10/20 Range/Units





  11:41 13:55 


 


WBC    (3.8-10.6)  k/uL


 


RBC    (4.30-5.90)  m/uL


 


Hgb    (13.0-17.5)  gm/dL


 


Hct    (39.0-53.0)  %


 


MCV    (80.0-100.0)  fL


 


Neutrophils # (Manual)    (1.3-7.7)  k/uL


 


Monocytes # (Manual)    (0-1.0)  k/uL


 


Metamyelocytes # (Man)    (0)  k/uL


 


Nucleated RBCs    (0-0)  /100 WBC


 


APTT    (22.0-30.0)  sec


 


ABG Total CO2    (19-24)  mmol/L


 


Sodium    (137-145)  mmol/L


 


BUN    (9-20)  mg/dL


 


Creatinine    (0.66-1.25)  mg/dL


 


Glucose    (74-99)  mg/dL


 


POC Glucose (mg/dL)  184 H  176 H  (75-99)  mg/dL


 


Calcium    (8.4-10.2)  mg/dL


 


AST    (17-59)  U/L


 


ALT    (4-49)  U/L


 


Total Protein    (6.3-8.2)  g/dL


 


Albumin    (3.5-5.0)  g/dL














Assessment and Plan


Plan: 


-STEMI status post cardiac cath and stent placement in the RCA.  Patient had a 

cardiopulmonary arrest on 07/06/2020


Acute hypoxic respiratory failure secondary to congestive heart failure acute 

systolic dysfunction with the acute exacerbation, patient is presently intubated

patient was intubated on 07/07/2020


-Asystole, cardio pulmonary arrest and asystole for 7 minutes on 07/06/2020 

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


-Possible posterior wall fracture cardiothoracic evaluation and further 

management as mentioned above


-Cardiac shock: Continue with the dobutamine,patient is also on Levophed drip


-Elevated liver enzymes secondary to shock liver improving now


-New-onset atrial fibrillation continue with heparin continue with beta blocker 

patient was started on amiodarone if he goes back into A. fib,


Acute kidney injury prerenal azotemia secondary to heart failure exacerbation


-Hypervolemic hyponatremia secondary to heart failure


Hypotension


Fall, with no dizziness or syncope


-Patient is on levofloxacin because of possibly of pneumonia


Hypertension


Hyperlipidemia


History of prostate cancer status post radiotherapy

## 2020-07-10 NOTE — PN
PROGRESS NOTE



Patient is seen for followup for acute kidney injury, mostly ATN associated with

contrast nephropathy and hypotension and hypoperfusion.  Renal function has been

improving.  Currently patient is not on any IV fluids or IV Lasix.  He is status post

cardiac arrest and has a temporary pacemaker.  Patient remains on the vent.  He was

following commands yesterday when sedation was held.  There are plans for a bedside COURTNEY

to rule out any vegetation.



PHYSICAL EXAMINATION:

On examination today, blood pressure was 109/67, heart rate 99 per minute.  Levophed is

down to 0.25 mcg/kg.  Patient is afebrile. FiO2 is at 50%.

Examination of the heart S1, S2.  Examination of the lungs, bilateral breath sounds are

heard.  Abdomen is soft, nontender.  Examination of the lower extremities shows no

evidence of edema.  CNS exam cannot be performed, patient is sedated.



LABS:

Show sodium 135, potassium 3.9, chloride 104, BUN 67, serum creatinine 1.57, alkaline

phosphatase 125, , ALT is 688.



ASSESSMENT:

1. Acute kidney injury, secondary to contrast nephropathy and ischemic ATN, currently

    improved.  Patient has good urine output.  Continue to maintain off of IV fluids

    and off of diuretics.

2. Hyponatremia which is hypervolemic, currently resolved.

3. Status post cardiac arrest.

4. Status post acute MI, status post cardiac catheterization and RCA stent placement.

5. Ventilator-dependent respiratory failure, post cardiac arrest.

6. Cardiomyopathy, ejection fraction at about 45%.

7. Hypokalemia, status post replacement.



PLAN:

Continue to monitor renal function.  Avoid nephrotoxic agents.  Repeat labs in a.m.





MMODL / IJN: [id number] / Job#: [id number]

## 2020-07-10 NOTE — P.TEE
Indications for Procedure(s): 


Heart normal.  Rule out valvular heart disease.  Rule out VSD


Date of Procedure: 07/10/20


Preoperative Diagnosis: 


Heart murmur.  Rule out VSD.  Rule out valvular heart disease


Postoperative Diagnosis: 


Possible rupture of the posterior wall with possible pseudoaneurysm.  Rule out 

VSD


Description of Procedure(s): 





INDICATION: Heart murmur.  Rule out valvular heart disease.  Rule out VSD or 

pseudoaneurysm 





CONSENT: Verbal consent is obtained from the family





PROCEDURE: .  Patient is already intubated and sedated.  A lubricated Omni probe

was introduced in the oropharynx and was advanced into the esophagus and 

stomach.  Multiple views were obtained.  Color, pulsed and continuous with 

Doppler studies were performed.  Patient tolerated the procedure well.





FINDINGS: The aortic valve is tricuspid and function normally with trace 

regurgitation.  Mitral valve is functioning normally with mild regurgitation.  

Left ankle function is fair with hypokinesia to akinesia of the posterior basal 

segment.  The interatrial septum did not show any shunt.  Left atrial appendage 

is free of any clot.  There appeared to be a flow starting near the posterior 

lateral wall suggestive of possible rupture with pseudo-aneurysm.  The possi

bility of VSD cannot be completely excluded.  The left ankle size appeared to be

normal.  Atrial sizes appear to be normal





IMPRESSION: .  #1.  Possible rupture of the ventricle in the posterolateral area

with pseudoaneurysm #2.  Rule out possibility of VSD #3.  Severe hypokinesia to 

akinesia of the posterior basal segment #4.  Normal aortic valve function.  #5. 

Mild mitral regurgitation #6.  Mild to moderately impaired LV function





PLAN: Right heart catheterization to rule out any stepup in oxygen saturation.  

Patient is felt to be high risk candidate for surgery.  Continue current medical

therapy

## 2020-07-10 NOTE — XR
EXAMINATION TYPE: XR chest 1V portable

 

DATE OF EXAM: 7/10/2020

 

COMPARISON: 7/9/2020

 

HISTORY: SOB, Follow Up

 

FINDINGS:

 

Indwelling tubes and catheters are unchanged.

 

Continued cardiomegaly with pulmonary venous congestion and airspace infiltrates as well as small eff
usions. There is however slight interval improvement noted. Pleural effusion unchanged.

 

IMPRESSION:

1. Persistent features of congestive failure with slight interval improvement suggested.

## 2020-07-11 VITALS — TEMPERATURE: 98.6 F

## 2020-07-11 VITALS — RESPIRATION RATE: 16 BRPM

## 2020-07-11 VITALS — HEART RATE: 90 BPM | SYSTOLIC BLOOD PRESSURE: 118 MMHG | DIASTOLIC BLOOD PRESSURE: 68 MMHG

## 2020-07-11 LAB
ALBUMIN SERPL-MCNC: 2.8 G/DL (ref 3.5–5)
ALP SERPL-CCNC: 126 U/L (ref 38–126)
ALT SERPL-CCNC: 1041 U/L (ref 4–49)
ANION GAP SERPL CALC-SCNC: 7 MMOL/L
AST SERPL-CCNC: 1495 U/L (ref 17–59)
BUN SERPL-SCNC: 76 MG/DL (ref 9–20)
CALCIUM SPEC-MCNC: 7.8 MG/DL (ref 8.4–10.2)
CELLS COUNTED: 200
CHLORIDE SERPL-SCNC: 106 MMOL/L (ref 98–107)
CO2 BLDA-SCNC: 25 MMOL/L (ref 19–24)
CO2 SERPL-SCNC: 24 MMOL/L (ref 22–30)
EOSINOPHIL # BLD MANUAL: 0.2 K/UL (ref 0–0.7)
ERYTHROCYTE [DISTWIDTH] IN BLOOD BY AUTOMATED COUNT: 3.44 M/UL (ref 4.3–5.9)
ERYTHROCYTE [DISTWIDTH] IN BLOOD: 13.9 % (ref 11.5–15.5)
GLUCOSE BLD-MCNC: 175 MG/DL (ref 75–99)
GLUCOSE BLD-MCNC: 205 MG/DL (ref 75–99)
GLUCOSE SERPL-MCNC: 171 MG/DL (ref 74–99)
HCO3 BLDA-SCNC: 24 MMOL/L (ref 21–25)
HCT VFR BLD AUTO: 36.1 % (ref 39–53)
HGB BLD-MCNC: 11.5 GM/DL (ref 13–17.5)
LYMPHOCYTES # BLD MANUAL: 2.03 K/UL (ref 1–4.8)
MCH RBC QN AUTO: 33.3 PG (ref 25–35)
MCHC RBC AUTO-ENTMCNC: 31.7 G/DL (ref 31–37)
MCV RBC AUTO: 105 FL (ref 80–100)
METAMYELOCYTES # BLD: 0.2 K/UL
MONOCYTES # BLD MANUAL: 2.44 K/UL (ref 0–1)
MYELOCYTES # BLD MANUAL: 0.41 K/UL
NEUTROPHILS NFR BLD MANUAL: 76 %
NEUTS SEG # BLD MANUAL: 15.43 K/UL (ref 1.3–7.7)
PCO2 BLDA: 44 MMHG (ref 35–45)
PH BLDA: 7.34 [PH] (ref 7.35–7.45)
PLATELET # BLD AUTO: 229 K/UL (ref 150–450)
PO2 BLDA: 72 MMHG (ref 83–108)
POTASSIUM SERPL-SCNC: 5.2 MMOL/L (ref 3.5–5.1)
PROT SERPL-MCNC: 5.2 G/DL (ref 6.3–8.2)
SODIUM SERPL-SCNC: 137 MMOL/L (ref 137–145)
WBC # BLD AUTO: 20.3 K/UL (ref 3.8–10.6)

## 2020-07-11 RX ADMIN — IPRATROPIUM BROMIDE AND ALBUTEROL SULFATE SCH ML: .5; 3 SOLUTION RESPIRATORY (INHALATION) at 03:55

## 2020-07-11 RX ADMIN — NOREPINEPHRINE BITARTRATE SCH MLS/HR: 1 INJECTION, SOLUTION, CONCENTRATE INTRAVENOUS at 12:33

## 2020-07-11 RX ADMIN — DOBUTAMINE HYDROCHLORIDE SCH MLS/HR: 200 INJECTION INTRAVENOUS at 12:33

## 2020-07-11 RX ADMIN — PROPOFOL SCH MLS/HR: 10 INJECTION, EMULSION INTRAVENOUS at 05:32

## 2020-07-11 RX ADMIN — PROPOFOL SCH MLS/HR: 10 INJECTION, EMULSION INTRAVENOUS at 17:56

## 2020-07-11 RX ADMIN — INSULIN ASPART SCH UNIT: 100 INJECTION, SOLUTION INTRAVENOUS; SUBCUTANEOUS at 06:31

## 2020-07-11 RX ADMIN — INSULIN ASPART SCH UNIT: 100 INJECTION, SOLUTION INTRAVENOUS; SUBCUTANEOUS at 12:31

## 2020-07-11 RX ADMIN — NOREPINEPHRINE BITARTRATE SCH: 1 INJECTION, SOLUTION, CONCENTRATE INTRAVENOUS at 04:32

## 2020-07-11 RX ADMIN — HEPARIN SODIUM SCH MLS/HR: 10000 INJECTION, SOLUTION INTRAVENOUS at 12:31

## 2020-07-11 RX ADMIN — IPRATROPIUM BROMIDE AND ALBUTEROL SULFATE SCH ML: .5; 3 SOLUTION RESPIRATORY (INHALATION) at 11:40

## 2020-07-11 RX ADMIN — CHLORHEXIDINE GLUCONATE SCH ML: 1.2 RINSE ORAL at 09:19

## 2020-07-11 RX ADMIN — IPRATROPIUM BROMIDE AND ALBUTEROL SULFATE SCH: .5; 3 SOLUTION RESPIRATORY (INHALATION) at 20:05

## 2020-07-11 RX ADMIN — PROPOFOL SCH MLS/HR: 10 INJECTION, EMULSION INTRAVENOUS at 11:58

## 2020-07-11 RX ADMIN — CLOPIDOGREL BISULFATE SCH MG: 75 TABLET ORAL at 09:19

## 2020-07-11 RX ADMIN — IPRATROPIUM BROMIDE AND ALBUTEROL SULFATE SCH ML: .5; 3 SOLUTION RESPIRATORY (INHALATION) at 15:12

## 2020-07-11 RX ADMIN — NOREPINEPHRINE BITARTRATE SCH MLS/HR: 1 INJECTION, SOLUTION, CONCENTRATE INTRAVENOUS at 04:31

## 2020-07-11 RX ADMIN — IPRATROPIUM BROMIDE AND ALBUTEROL SULFATE SCH ML: .5; 3 SOLUTION RESPIRATORY (INHALATION) at 07:27

## 2020-07-11 RX ADMIN — PANTOPRAZOLE SODIUM SCH MG: 40 INJECTION, POWDER, FOR SOLUTION INTRAVENOUS at 09:19

## 2020-07-11 NOTE — P.PN
Subjective





79 years old male with past medical history of hypertension, hyperlipidemia, 

dementia, prostate cancer status post radiotherapy , patient presents because of

chest pain found to have STEMI, he underwent cardiac cath status post stent 

placement in the right coronary artery.  He is in the ICU.


  His creatinine increased today to 1.4, WBC is high at 18.6 and 19.6 K, sodium 

131, his chest x-ray showing pulmonary congestion with possible interstitial 

pneumonia


He saturating 90s on 7 L/m oxygen via nasal cannula.


Cardiologist already given 1 dose of Lasix IV and continued on 40 mg by mouth 

daily, 1 going to give him another dose of Lasix.  Blood pressure is on the low 

normal but he is tachypneic at 35.  A but that side nurse his breathing is li

ttle better in the evening than in the morning and he did not need BiPAP for 

now.


He is also on aspirin and Plavix, he is on metoprolol 12.5 mg and lisinopril 2.5

mg, we going to hold lisinopril for now


Also call pulmonary and nephrology consult





07/04/2020


Patient breathing easier but however his significantly tachypneic and mid 20s, 

he saturating 94% on 9 L oxygen via nasal cannula.  His blood pressure 96/57 and

heart rate 77.


He still bleeding from dyspnea but no chest pain, he has little cough with clear

phlegm.


This morning he was trying to get out of bed to the restroom by himself when he 

fell on hit his head in the forehead area.


Chest x-ray: Pulmonary edema, atypical pneumonia, some right lower lobe 

pneumonia


WBC this morning is 18.1 K with slight improvement.  Sodium 132, creatinine 

stable at 1.5.  Procol stone and is elevated at 0.23


Patient is currently on by mouth Lasix, lisinopril is held, cardiologist 

recommended to continue with metoprolol 12.5, we going to check with cardiology 

about holding parameters.  Continue with Levaquin.


Ordered EKG








7/5/20


Patient awake and alert, his breathing is easier although he still complains 

from some chest pain with little dyspnea and the 10 cough.


He is saturating 94% on 3-5 L of oxygen via nasal cannula.  Rest of Vitas looks 

stable, however her blood pressure is on the low side, this morning was 78/56-

94/59 


WBC is 17.1 K, hemoglobin 12.2, sodium 128, creatinine went up to 2.3.  Lactic 

acid is elevated at 3.8.  ProBNP is 41 500 


sputum culture is pending.  Chest x-ray showing stable changes with diffuse 

parenchymal changes or diffuse pneumonia or CHF/pulmonary edema


Cardiology started the patient on dobutamine drip, also he got 1 dose of Lasix 

60 mg IV 1 and on sodium bicarbonate tablets.


Patient also is on Levaquin 250 mg by mouth daily, which is renal dose.  

Pulmonary nephrology and cardiology teams on the case





07/06/2020


Patient is still complaining of shortness of breath patient has significant 

rhonchi streaking heart failure exacerbation patient is presently on dobutamine 

drip does have atrial fibrillation for which patient is on Cardizem, patient is 

still hyponatremic receiving sodium bicarbonate tablets is also on Lasix, 

patient is on metoprolol as well.  Patient was started on IV heparin.  We will d

yspnea Cardizem as it decreases as it [facility] heart failure.  

Nephrology is following the patient patient probably has hypovolemic 

hyponatremia





007/07/2020


Patient went into asystole after which the patient had a temporary transvenous a

speckled placement , patient was also intubated because of respiratory failure. 

Patient is presently on dopamine drip and also on norepinephrine drip.  Patient 

is presently sinus rhythm.  Patient still has pulmonary edema on the chest x-ray

nephrology is managing diuretics patient serum sodium continues to be low at 

124we creatinine continued to go up to subacute 2.79





July 2020


Patient remains on ventilator support with FiO2 of 50% deep of 5 respiratory set

up respiratory rate of 12.  Blood gases did not show any significant 

abnormality.  Patient remains are not epinephrine 8 mcg/m dobutamine drip 

propofol drip heparin drip presently sinus rhythm, nutrition via NG tube





07/09/2020


No significant change in his overall clinical condition except for some 

improvement in his serum sodium of 133 white blood cell count continued to go up

patient remains on ventilator support patient is on 2 pressors.  Fairly good 

urine output liver enzymes are coming down patient had a sick sedation 

medication today hypokalemia potassium is being replaced





07/10/2020


Patient underwent transesophageal echocardiogram which was concerning for 

ruptured posterior wall.  Because of his cardiothoracic surgery was consulted 

and evaluated the patient and any surgical intervention at this time is more 

harmful than benefit because of which no further intervention is being planned 

at this time although there is overall clinical improvement except for white 

blood cell count which continues to cough serum sodium improved, kidney function

improved, liver enzymes improved, patient remains in a dual pressor support and 

ventilatory support able to ventilate well chest x-ray showing CHF








07/11/2020


Patient.  The which showed posterior wall rupture along with the of VSD and 

significant hypokinesis of the inferior wall.  Patient prognosis is extremely 

poor with the wall rupture post microinfarction and with 3 AICD.  Patient does 

have significant systolic murmur which is consistent with VSD.  Patient is on 

multiple medications and multiple pressors including norepinephrine, 

vasopressin, dobutamine, patient is on paralytic agents as well as propofol.  

Patient is also on Cardizem which the actually counteract side effects of 

dobutamine and vasopressin unfortunately there is no much choice for atrial 

fibrillation.  Patient is not on amiodarone because of elevated liver enzymes 

which is again secondary to shock liver patient cannot get the digoxin because 

of renal failure patient is a on metoprolol cannot receive any IV medication 

because of low blood pressure and multiple antidepressant medications.  Patient 

prognosis is extremely poor in spite of for surgery for post-MI wall rupture and

VSD but patient is an extremely poor candidate and will not make her do the 

surgery because of which cardiothoracic surgery is not recommending any surgical

intervention at this time.  And prognosis is extremely poor same thing was 

discussed with the family and I discussed hospice comfort care withdrawal of the

ventilatory support and pressor support was discussed with the family which his 

daughter daughter is coming today to discuss with rest of the family members and

will make induration regarding comfort care and hospice withdrawal of supportive

care.





review of systems: Unable to obtain as patient is intubated and sedated at this 

time





All inpatient medications were reviewed and appropriate changes in these 

medications as dictated in the interval history and assessment and plan.








Objective





- Vital Signs


Vital signs: 


                                   Vital Signs











Temp  98.6 F   07/11/20 12:00


 


Pulse  115 H  07/11/20 13:00


 


Resp  15   07/11/20 13:00


 


BP  116/73   07/11/20 13:00


 


Pulse Ox  90 L  07/11/20 13:00








                                 Intake & Output











 07/10/20 07/11/20 07/11/20





 18:59 06:59 18:59


 


Intake Total 3904.649 5800.222 662.006


 


Output Total 610 550 178


 


Balance 212.251 2552.222 484.006


 


Weight  84.5 kg 


 


Intake:   


 


   481 266


 


    0.9 240 200 70


 


    0.9NS Sheath 240 240 140


 


    Arterial Line 65 36 21


 


    Diltiazem 125 mg In  5 35





    Sodium Chloride 0.9% 100   





    ml @ 5 MG/HR 5 mls/hr IV   





    .Q24H [facility]IL[facility] Rx#:[id number]   


 


  Intake, IV Titration 590.282 597.222 290.006





  Amount   


 


    DOBUTamine DRIP 500 mg In 250  





    Dextrose/Water 1 250ml.   





    bag @ 5 MCG/KG/MIN 11.88   





    mls/hr IV .Q21H3M [facility] Rx#   





    :[id number]   


 


    Heparin Sod,Pork in 0.45%  188.354 52.556





    NaCl 25,000 unit In 0.45   





    % NaCl 1 250ml.bag @ 12.5   





    UNITS/KG/HR 9.95 mls/hr   





    IV .Q24H [facility] Rx#:   





    [id number]   


 


    Norepinephrine 32 mg In 140.831 226.351 141.964





    Sodium Chloride 0.9% 218   





    ml @ 0.65 MCG/KG/MIN 26.   





    843 mls/hr IV .Q9H19M [facility]   





    Rx#:[id number]   


 


    Propofol 1,000 mg In 199.451 182.517 95.486





    Empty Bag 1 bag @ Titrate   





    IV .Q0M [facility] Rx#:   





    [id number]   


 


  Oral 70  


 


  Tube Feeding 318 636 106


 


Output:   


 


  Urine 610 550 178


 


Other:   


 


  Voiding Method Indwelling Catheter Indwelling Catheter Indwelling Catheter








                       ABP, PAP, CO, CI - Last Documented











Arterial Blood Pressure        80/58

















- Exam


GENERAL:patient is intubated sedated


HEENT: Pupils are round and equally reacting to light. EOMI. No scleral icterus.

No conjunctival pallor. Normocephalic, atraumatic. No pharyngeal erythema. No 

thyromegaly. 


CARDIOVASCULAR: S1 and S2 present. No or gallops.  Does have elevated JVD.  

Systolic murmur consistent with  VSD


-PULMONARY: Diffuse bilateral crackles


ABDOMEN: Soft, nontender, nondistended, normoactive bowel sounds. No palpable 

organomegaly. 


MUSCULOSKELETAL: No joint swelling or deformity. 


EXTREMITIES: No cyanosis, clubbing, or pedal edema. 


NEUROLOGICAL: unable to assess. 


SKIN: No rashes. no petechiae.








- Labs


CBC & Chem 7: 


                                 07/11/20 04:25





                                 07/11/20 04:25


Labs: 


                  Abnormal Lab Results - Last 24 Hours (Table)











  07/10/20 07/10/20 07/10/20 Range/Units





  17:55 18:41 23:33 


 


WBC     (3.8-10.6)  k/uL


 


RBC     (4.30-5.90)  m/uL


 


Hgb     (13.0-17.5)  gm/dL


 


Hct     (39.0-53.0)  %


 


MCV     (80.0-100.0)  fL


 


Neutrophils # (Manual)     (1.3-7.7)  k/uL


 


Monocytes # (Manual)     (0-1.0)  k/uL


 


Metamyelocytes # (Man)     (0)  k/uL


 


Myelocytes # (Manual)     (0)  k/uL


 


Nucleated RBCs     (0-0)  /100 WBC


 


APTT     (22.0-30.0)  sec


 


ABG pH     (7.35-7.45)  


 


ABG pO2     ()  mmHg


 


ABG Total CO2     (19-24)  mmol/L


 


ABG O2 Saturation     (94-97)  %


 


Potassium     (3.5-5.1)  mmol/L


 


BUN     (9-20)  mg/dL


 


Creatinine     (0.66-1.25)  mg/dL


 


Glucose     (74-99)  mg/dL


 


POC Glucose (mg/dL)  170 H  182 H  164 H  (75-99)  mg/dL


 


Calcium     (8.4-10.2)  mg/dL


 


AST     (17-59)  U/L


 


ALT     (4-49)  U/L


 


Total Protein     (6.3-8.2)  g/dL


 


Albumin     (3.5-5.0)  g/dL














  07/11/20 07/11/20 07/11/20 Range/Units





  04:25 04:25 04:25 


 


WBC  20.3 H    (3.8-10.6)  k/uL


 


RBC  3.44 L    (4.30-5.90)  m/uL


 


Hgb  11.5 L    (13.0-17.5)  gm/dL


 


Hct  36.1 L    (39.0-53.0)  %


 


MCV  105.0 H    (80.0-100.0)  fL


 


Neutrophils # (Manual)  15.43 H    (1.3-7.7)  k/uL


 


Monocytes # (Manual)  2.44 H    (0-1.0)  k/uL


 


Metamyelocytes # (Man)  0.20 H    (0)  k/uL


 


Myelocytes # (Manual)  0.41 H    (0)  k/uL


 


Nucleated RBCs  10 H    (0-0)  /100 WBC


 


APTT   40.0 H   (22.0-30.0)  sec


 


ABG pH     (7.35-7.45)  


 


ABG pO2     ()  mmHg


 


ABG Total CO2     (19-24)  mmol/L


 


ABG O2 Saturation     (94-97)  %


 


Potassium    5.2 H  (3.5-5.1)  mmol/L


 


BUN    76 H  (9-20)  mg/dL


 


Creatinine    1.56 H  (0.66-1.25)  mg/dL


 


Glucose    171 H  (74-99)  mg/dL


 


POC Glucose (mg/dL)     (75-99)  mg/dL


 


Calcium    7.8 L  (8.4-10.2)  mg/dL


 


AST    1495 H  (17-59)  U/L


 


ALT    1041 H  (4-49)  U/L


 


Total Protein    5.2 L  (6.3-8.2)  g/dL


 


Albumin    2.8 L  (3.5-5.0)  g/dL














  07/11/20 07/11/20 07/11/20 Range/Units





  04:57 05:50 12:10 


 


WBC     (3.8-10.6)  k/uL


 


RBC     (4.30-5.90)  m/uL


 


Hgb     (13.0-17.5)  gm/dL


 


Hct     (39.0-53.0)  %


 


MCV     (80.0-100.0)  fL


 


Neutrophils # (Manual)     (1.3-7.7)  k/uL


 


Monocytes # (Manual)     (0-1.0)  k/uL


 


Metamyelocytes # (Man)     (0)  k/uL


 


Myelocytes # (Manual)     (0)  k/uL


 


Nucleated RBCs     (0-0)  /100 WBC


 


APTT     (22.0-30.0)  sec


 


ABG pH  7.34 L    (7.35-7.45)  


 


ABG pO2  72 L    ()  mmHg


 


ABG Total CO2  25 H    (19-24)  mmol/L


 


ABG O2 Saturation  92.3 L    (94-97)  %


 


Potassium     (3.5-5.1)  mmol/L


 


BUN     (9-20)  mg/dL


 


Creatinine     (0.66-1.25)  mg/dL


 


Glucose     (74-99)  mg/dL


 


POC Glucose (mg/dL)   205 H  175 H  (75-99)  mg/dL


 


Calcium     (8.4-10.2)  mg/dL


 


AST     (17-59)  U/L


 


ALT     (4-49)  U/L


 


Total Protein     (6.3-8.2)  g/dL


 


Albumin     (3.5-5.0)  g/dL














  07/11/20 Range/Units





  12:40 


 


WBC   (3.8-10.6)  k/uL


 


RBC   (4.30-5.90)  m/uL


 


Hgb   (13.0-17.5)  gm/dL


 


Hct   (39.0-53.0)  %


 


MCV   (80.0-100.0)  fL


 


Neutrophils # (Manual)   (1.3-7.7)  k/uL


 


Monocytes # (Manual)   (0-1.0)  k/uL


 


Metamyelocytes # (Man)   (0)  k/uL


 


Myelocytes # (Manual)   (0)  k/uL


 


Nucleated RBCs   (0-0)  /100 WBC


 


APTT  41.0 H  (22.0-30.0)  sec


 


ABG pH   (7.35-7.45)  


 


ABG pO2   ()  mmHg


 


ABG Total CO2   (19-24)  mmol/L


 


ABG O2 Saturation   (94-97)  %


 


Potassium   (3.5-5.1)  mmol/L


 


BUN   (9-20)  mg/dL


 


Creatinine   (0.66-1.25)  mg/dL


 


Glucose   (74-99)  mg/dL


 


POC Glucose (mg/dL)   (75-99)  mg/dL


 


Calcium   (8.4-10.2)  mg/dL


 


AST   (17-59)  U/L


 


ALT   (4-49)  U/L


 


Total Protein   (6.3-8.2)  g/dL


 


Albumin   (3.5-5.0)  g/dL














Assessment and Plan


Plan: 


-STEMI status post cardiac cath and stent placement in the RCA.  Patient had a 

cardiopulmonary arrest on 07/06/2020 patient myocardial infarction is compl

icated by ventricular wall rupture and ventricular septal defect which is 

extremely poor prognosis





Acute hypoxic respiratory failure secondary to congestive heart failure acute 

systolic dysfunction with the acute exacerbation, patient is presently intubated

patient was intubated on 07/07/2020 patient is presently on paralytic agents as 

well patient isn't presently on 50% FiO2


-Asystole, cardio pulmonary arrest and asystole for 7 minutes on 07/06/2020 

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


-Possible posterior wall fracture cardiothoracic evaluation and further 

management as mentioned above


-Cardiac shock: Patient on multiple pressor support as mentioned above


-Elevated liver enzymes secondary to shock liver worse compared to yesterday


-New-onset atrial fibrillation 


Acute kidney injury prerenal azotemia secondary to heart failure exacerbation


-Hypervolemic hyponatremia secondary to heart failure which improved now


Hypotension


Fall, with no dizziness or syncope


-Patient is on levofloxacin because of possibly of pneumonia


Hypertension


Hyperlipidemia


History of prostate cancer status post radiotherapy

## 2020-07-11 NOTE — PN
PROGRESS NOTE



DATE OF SERVICE:

07/11/2020



HISTORY OF PRESENT ILLNESS:

This is a 79-year-old male admitted back on July 2.  The patient came in for ST-segment

elevation myocardial infarction.  The patient had a right coronary artery stent placed.

He also came in with a diagnosis of acute MI, CHF, hypoxemia, and atrial fibrillation.

He had an episode of cardiopulmonary arrest about 4 days ago.  This occurred on July 7.

He apparently required intubation and mechanical ventilation for a brief episode of

asystole.  He did have cardiopulmonary resuscitation for about 7 minutes before there

was return of spontaneous circulation.  Currently, he remains on the ventilator.  He is

on the volume assist-control mode rate of 12, tidal volume 450, FiO2 of 50%, PEEP of 5.

Blood gases show a pO2 of 72, pCO2 44, and pH 7.34.  The patient is currently on saline

at 10 mL an hour, propofol at 30 mcg/kg/ minute, Cardizem drip at 2.5 mg/hour,

dobutamine at 5 mcg/kg/minute, norepinephrine at 51 mcg/min, heparin via weight based

protocol and Vital high-protein at 53 with goal of 53 cc an hours.  He had a

transesophageal echocardiogram done yesterday.  It showed a ruptured posterior lateral

wall of the left ventricle with a pseudoaneurysm.  Today, we are going to give him

Nimbex, and start drip at 2 mcg/kg/minute and use some vasopressin at 0.03 units/minute

for additional blood pressure support. I told the nurses to increase the Levophed up to

70 mcg/minute for blood pressure support but no higher than that.



We will talk to the family about code status.  We have not had the opportunity to do

that as yet.



PHYSICAL EXAMINATION:

VITAL SIGNS: Current vital signs are reviewed. His temperature is 98.4, heart rate 112,

respiratory rate is 16 before paralyzation, 12 after paralyzation.  Blood pressure

111/77, mean 88, and saturations are 92%.  GENERAL: Appears in no acute distress.

Currently sedated.

HEENT: Examination is grossly unremarkable.  There is an orally placed endotracheal

tube and NG tube.

NECK:  Supple with full range of motion.  Neck veins are flat.

CARDIOVASCULAR: Examination reveals tachycardia.  The patient is in atrial

fibrillation.  Heart rate about 120.  There is a harsh systolic sound possibly from an

underlying VSD.

LUNGS: Reveal coarse rhonchi.  Some expiratory wheezes.  Some crackles as well.

ABDOMEN:  Soft. Bowel sounds are noted.

EXTREMITIES: Intact.  No edema.

SKIN: Without rash.

NEUROLOGIC: Examination could not be properly evaluated.



LABS:

Reviewed.  White count 20.3, hemoglobin 11.5, hematocrit 36.1, platelet count 229,000,

PTT is 40. Blood gases have been noted.  PO2 72, pCO2 44, pH 7.34.  Sodium 137,

potassium 5.2, chloride 106, CO2 24, anion gap is 7. BUN and creatinine were 76 and

1.56.  AST 1495, ALT 1041, albumin 2.8.



Microbiology is negative.



X-RAY:

Chest x-ray from July 11 shows diffuse bilateral infiltrates.  Chest x-ray is stable

from yesterday's x-ray.



MEDICATIONS:

Medications are reviewed.



ASSESSMENT:

1. Status post cardiopulmonary arrest with asystole with cardiopulmonary

    resuscitation, with eventual return of spontaneous circulation, of unclear

    etiology.

2. Hypoxemic respiratory failure requiring intubation and mechanical ventilation on

    July 7, 2020, with failure to wean from mechanical ventilation.

3. Acute inferior wall ST-segment elevation myocardial infarction, status post

    stenting of the right coronary artery.

4. Acute pulmonary edema secondary to acute systolic congestive heart failure and

    ischemic cardiomyopathy.

5. Transesophageal echocardiogram evidence on July 10 of a ruptured LV and

    pseudoaneurysm, and possible ventricular septal defect.

6. Benign essential hypertension.

7. Hyperlipidemia.

8. History of mild dementia.

9. Atrial fibrillation with rapid ventricular response.

10.History of coronary artery disease.

11.History of acute kidney injury.

12.Possible right lower lobe pneumonia.

13.Congestive hepatopathy.



PLAN:

Overall, the patient's condition has worsened.  He is requiring higher doses of

vasopressor.  He will be started on vasopressin in addition to norepinephrine which

should not exceed 70 mcg/minute. We will give him Nimbex for paralysis and better

synchrony up with the ventilator.  I will call his family and let them know what is

going on.  Currently his gases are reasonable.  The transesophageal echocardiogram

results from yesterday were noted.  He remains on Diprivan, Cardizem, dobutamine,

norepinephrine and heparin via weight based protocol.  Prognosis is guarded.



Critical care time 33 minutes.





MMODL / IJN: [id number] / Job#: [id number]

## 2020-07-11 NOTE — P.PN
Subjective





Patient seen in follow-up for acute kidney injury.  Renal function is stable.  

He remains on 60 mics of Levophed as well as vasopressin.  He is also on 

Cardizem drip.  Dobutamine has been discontinued.  Urine output the last 2 hours

was about 15 mL per hour.  He is receiving tube feeding.  Currently on 50% FiO2.





Vital signs are stable - on multiple vasopressors.


General: The patient appeared well nourished and normally developed. 


HEENT: Head exam is unremarkable. Neck is without jugular venous distension.  

Intubated.


LUNGS: Breath sounds decreased.


HEART: Irregular rate and rhythm.  Murmur present.


ABDOMEN: No distention noted.


EXTREMITITES: No edema.





Objective





- Vital Signs


Vital signs: 


                                   Vital Signs











Temp  98.4 F   07/11/20 08:00


 


Pulse  106 H  07/11/20 12:00


 


Resp  15   07/11/20 12:00


 


BP  111/77   07/11/20 10:00


 


Pulse Ox  91 L  07/11/20 12:00








                                 Intake & Output











 07/10/20 07/11/20 07/11/20





 18:59 06:59 18:59


 


Intake Total 2875.528 3170.222 624.006


 


Output Total 610 550 170


 


Balance 127.385 0468.222 454.006


 


Weight  84.5 kg 


 


Intake:   


 


   481 228


 


    0.9 240 200 60


 


    0.9NS Sheath 240 240 120


 


    Arterial Line 65 36 18


 


    Diltiazem 125 mg In  5 30





    Sodium Chloride 0.9% 100   





    ml @ 5 MG/HR 5 mls/hr IV   





    .Q24H [facility] Rx#:[id number]   


 


  Intake, IV Titration 590.282 597.222 290.006





  Amount   


 


    DOBUTamine DRIP 500 mg In 250  





    Dextrose/Water 1 250ml.   





    bag @ 5 MCG/KG/MIN 11.88   





    mls/hr IV .Q21H3M [facility] Rx#   





    :[id number]   


 


    Heparin Sod,Pork in 0.45%  188.354 52.556





    NaCl 25,000 unit In 0.45   





    % NaCl 1 250ml.bag @ 12.5   





    UNITS/KG/HR 9.95 mls/hr   





    IV .Q24H [facility] Rx#:   





    [id number]   


 


    Norepinephrine 32 mg In 140.831 226.351 141.964





    Sodium Chloride 0.9% 218   





    ml @ 0.65 MCG/KG/MIN 26.   





    843 mls/hr IV .Q9H19M [facility]   





    Rx#:[id number]   


 


    Propofol 1,000 mg In 199.451 182.517 95.486





    Empty Bag 1 bag @ Titrate   





    IV .Q0M [facility] Rx#:   





    [id number]   


 


  Oral 70  


 


  Tube Feeding 318 636 106


 


Output:   


 


  Urine 610 550 170


 


Other:   


 


  Voiding Method Indwelling Catheter Indwelling Catheter 








                       ABP, PAP, CO, CI - Last Documented











Arterial Blood Pressure        80/60

















- Labs


CBC & Chem 7: 


                                 07/11/20 04:25





                                 07/11/20 04:25


Labs: 


                  Abnormal Lab Results - Last 24 Hours (Table)











  07/10/20 07/10/20 07/10/20 Range/Units





  13:55 17:55 18:41 


 


WBC     (3.8-10.6)  k/uL


 


RBC     (4.30-5.90)  m/uL


 


Hgb     (13.0-17.5)  gm/dL


 


Hct     (39.0-53.0)  %


 


MCV     (80.0-100.0)  fL


 


Neutrophils # (Manual)     (1.3-7.7)  k/uL


 


Monocytes # (Manual)     (0-1.0)  k/uL


 


Metamyelocytes # (Man)     (0)  k/uL


 


Myelocytes # (Manual)     (0)  k/uL


 


Nucleated RBCs     (0-0)  /100 WBC


 


APTT     (22.0-30.0)  sec


 


ABG pH     (7.35-7.45)  


 


ABG pO2     ()  mmHg


 


ABG Total CO2     (19-24)  mmol/L


 


ABG O2 Saturation     (94-97)  %


 


Potassium     (3.5-5.1)  mmol/L


 


BUN     (9-20)  mg/dL


 


Creatinine     (0.66-1.25)  mg/dL


 


Glucose     (74-99)  mg/dL


 


POC Glucose (mg/dL)  176 H  170 H  182 H  (75-99)  mg/dL


 


Calcium     (8.4-10.2)  mg/dL


 


AST     (17-59)  U/L


 


ALT     (4-49)  U/L


 


Total Protein     (6.3-8.2)  g/dL


 


Albumin     (3.5-5.0)  g/dL














  07/10/20 07/11/20 07/11/20 Range/Units





  23:33 04:25 04:25 


 


WBC   20.3 H   (3.8-10.6)  k/uL


 


RBC   3.44 L   (4.30-5.90)  m/uL


 


Hgb   11.5 L   (13.0-17.5)  gm/dL


 


Hct   36.1 L   (39.0-53.0)  %


 


MCV   105.0 H   (80.0-100.0)  fL


 


Neutrophils # (Manual)   15.43 H   (1.3-7.7)  k/uL


 


Monocytes # (Manual)   2.44 H   (0-1.0)  k/uL


 


Metamyelocytes # (Man)   0.20 H   (0)  k/uL


 


Myelocytes # (Manual)   0.41 H   (0)  k/uL


 


Nucleated RBCs   10 H   (0-0)  /100 WBC


 


APTT    40.0 H  (22.0-30.0)  sec


 


ABG pH     (7.35-7.45)  


 


ABG pO2     ()  mmHg


 


ABG Total CO2     (19-24)  mmol/L


 


ABG O2 Saturation     (94-97)  %


 


Potassium     (3.5-5.1)  mmol/L


 


BUN     (9-20)  mg/dL


 


Creatinine     (0.66-1.25)  mg/dL


 


Glucose     (74-99)  mg/dL


 


POC Glucose (mg/dL)  164 H    (75-99)  mg/dL


 


Calcium     (8.4-10.2)  mg/dL


 


AST     (17-59)  U/L


 


ALT     (4-49)  U/L


 


Total Protein     (6.3-8.2)  g/dL


 


Albumin     (3.5-5.0)  g/dL














  07/11/20 07/11/20 07/11/20 Range/Units





  04:25 04:57 05:50 


 


WBC     (3.8-10.6)  k/uL


 


RBC     (4.30-5.90)  m/uL


 


Hgb     (13.0-17.5)  gm/dL


 


Hct     (39.0-53.0)  %


 


MCV     (80.0-100.0)  fL


 


Neutrophils # (Manual)     (1.3-7.7)  k/uL


 


Monocytes # (Manual)     (0-1.0)  k/uL


 


Metamyelocytes # (Man)     (0)  k/uL


 


Myelocytes # (Manual)     (0)  k/uL


 


Nucleated RBCs     (0-0)  /100 WBC


 


APTT     (22.0-30.0)  sec


 


ABG pH   7.34 L   (7.35-7.45)  


 


ABG pO2   72 L   ()  mmHg


 


ABG Total CO2   25 H   (19-24)  mmol/L


 


ABG O2 Saturation   92.3 L   (94-97)  %


 


Potassium  5.2 H    (3.5-5.1)  mmol/L


 


BUN  76 H    (9-20)  mg/dL


 


Creatinine  1.56 H    (0.66-1.25)  mg/dL


 


Glucose  171 H    (74-99)  mg/dL


 


POC Glucose (mg/dL)    205 H  (75-99)  mg/dL


 


Calcium  7.8 L    (8.4-10.2)  mg/dL


 


AST  1495 H    (17-59)  U/L


 


ALT  1041 H    (4-49)  U/L


 


Total Protein  5.2 L    (6.3-8.2)  g/dL


 


Albumin  2.8 L    (3.5-5.0)  g/dL














  07/11/20 Range/Units





  12:10 


 


WBC   (3.8-10.6)  k/uL


 


RBC   (4.30-5.90)  m/uL


 


Hgb   (13.0-17.5)  gm/dL


 


Hct   (39.0-53.0)  %


 


MCV   (80.0-100.0)  fL


 


Neutrophils # (Manual)   (1.3-7.7)  k/uL


 


Monocytes # (Manual)   (0-1.0)  k/uL


 


Metamyelocytes # (Man)   (0)  k/uL


 


Myelocytes # (Manual)   (0)  k/uL


 


Nucleated RBCs   (0-0)  /100 WBC


 


APTT   (22.0-30.0)  sec


 


ABG pH   (7.35-7.45)  


 


ABG pO2   ()  mmHg


 


ABG Total CO2   (19-24)  mmol/L


 


ABG O2 Saturation   (94-97)  %


 


Potassium   (3.5-5.1)  mmol/L


 


BUN   (9-20)  mg/dL


 


Creatinine   (0.66-1.25)  mg/dL


 


Glucose   (74-99)  mg/dL


 


POC Glucose (mg/dL)  175 H  (75-99)  mg/dL


 


Calcium   (8.4-10.2)  mg/dL


 


AST   (17-59)  U/L


 


ALT   (4-49)  U/L


 


Total Protein   (6.3-8.2)  g/dL


 


Albumin   (3.5-5.0)  g/dL














Assessment and Plan


Plan: 





Assessment:


1.  Acute kidney injury secondary to ATN secondary to cardiopulmonary arrest.  

Creatinine stable at 1.56 today.


2.  Status post cardiac arrest.


3.  Acute MI status post cardiac catheterization with stenting of the RCA.


4.  Hypotension maintained on high-dose vasopressors.


5.  Acute hypoxic respiratory failure.


6.  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 

admission.


7.  A. fib with RVR maintained on Cardizem drip.





Plan:


Maintain tube feeding.


Continue to monitor renal function and urine output.


If remains oliguric, I will challenge him with a dose of IV Lasix.


Repeat electrolytes this evening.


Potential hospice being considered.


At this time, patient will not be able to tolerate renal replacement therapy due

to severe hemodynamic instability.

## 2020-07-11 NOTE — PN
PROGRESS NOTE



Carlton is a 79-year-old gentleman who was admitted to hospital with acute inferior wall

myocardial infarction on 07/02/2020.  I have seen him at that time and performed an

emergent cardiac catheterization that revealed acute occlusion of the right coronary

artery.  The patient underwent angioplasty of the same.  His post angioplasty course

was complicated by respiratory failure, cardiac arrest secondary to bradycardia and he

subsequently underwent a COURTNEY, temporary pacemaker and right heart catheterization.

Workup so far has revealed a pseudoaneurysm, possible VSD and the patient remains

intubated on vent, severely hypotensive in cardiogenic shock, had been evaluated by a

cardiothoracic surgeon and thought not to be a candidate for any surgical intervention.

He developed atrial fibrillation and is currently on intravenous heparin.  After my

initial evaluation with the acute MI, I am seeing him again today.  The patient is

currently on Lipitor, Plavix, Cardizem, dobutamine, which I am going to taper and stop

and pressors.



On exam, heart rate is 115 beats per minute.  Blood pressure is 82/59, respiratory rate

is 12.  He is intubated on vent with FiO2 of 50%.  Chest exam reveals good air entry.

I do not hear any crackles or rhonchi.  Heart exam reveals first and second heart

sounds with a pansystolic murmur that radiates across the chest, abdomen is soft.  Exam

of extremities reveals mild edema.  Peripheral pulses are palpable.



Labs show that the white cell count is 20, hemoglobin is 11.5, potassium is 5.2, BUN is

76, creatinine is 1.5.  AST, ALT are elevated.



ASSESSMENT:

1. Cardiogenic shock.

2. Acute inferior wall myocardial infarction.

3. Persistent atrial fibrillation.

4. Severe hypotension on pressors.

5. Mechanical complications related to inferior wall myocardial infarction with

    pseudoaneurysm and possible VSD.

I reviewed the right heart cath findings, but I do not have the blood gases done at

that time.  The patient's prognosis is guarded.





MMODL / IJN: [id number] / Job#: [id number]

## 2020-07-11 NOTE — XR
EXAMINATION TYPE: XR chest 1V portable

 

DATE OF EXAM: 7/11/2020

 

COMPARISON: 7/10/2020

 

HISTORY: SOB, Follow Up

 

FINDINGS:

 

Indwelling tubes and catheters are unchanged.

 

No change in diffuse bilateral infiltrates

 

Stable appearance of the cardio-mediastinal structures at this time.

 

Pleural effusion unchanged.

 

IMPRESSION:

1.  Stable portable chest.  Clinical correlation and follow up until resolution is recommended.

## 2020-07-12 NOTE — P.DS
Providers


Date of admission: 


20 08:09





Attending physician: 


Heide Rico





Consults: 





                                        





20 08:00


Consult Physician Stat 


   Consulting Provider: [facility]


   Consult Reason/Comments: STEMI ACTIVATION COMPLETE


   Do you want consulting provider notified?: Yes





20 19:24


Consult Physician Routine 


   Consulting Provider: Jayjay Roper


   Consult Reason/Comments: hypoxia, icu management


   Do you want consulting provider notified?: Already Contacted





20 20:52


Consult Physician Urgent 


   Consulting Provider: Indira Garcia


   Consult Reason/Comments: parul


   Do you want consulting provider notified?: Yes





07/10/20 10:12


Consult Physician Routine 


   Consulting Provider: Margo Mart


   Consult Reason/Comments: left ventrical pseudoanyeurism


   Do you want consulting provider notified?: Already Contacted











Primary care physician: 


Randy Melo





[facility] Course: 


Please refer to my progress note for further details since of family decided 

that on comfort care and hospice patient was terminally extubated and all the 

pressors were discontinued patient subsequently  please refer to nursing 

documentation for exact time of death.  For rest of the hospitalization course 

and medical problems please refer to HPI for further details





Preliminary cause of death: Acute myocardial infarction





Patient Condition at Discharge: Critical





Plan - Discharge Summary


Discharge Rx Participant: Yes


New Discharge Prescriptions: 


No Action


   Beet Root 1000mg 2,000 mg PO DAILY


   amLODIPine [Norvasc] 5 mg PO DAILY


   Atorvastatin Calcium [Lipitor] 20 mg PO HS


   Cholecalciferol [Vitamin D3 (25 Mcg = 1000 Iu)] 5,000 unit PO DAILY


   Citalopram Hydrobromide [CeleXA] 20 mg PO DAILY


   Donepezil [Aricept] 10 mg PO DAILY


   Magnesium Oxide [Mag-Ox] 800 mg PO DAILY


   Memantine HCl [Memantine HCl ER] 28 mg PO DAILY


   Tamsulosin HCl [Flomax] 0.4 mg PO DAILY


   traZODone  mg PO HS


   Vitamin B Complex 1 tab PO DAILY


Discharge Medication List





Atorvastatin Calcium [Lipitor] 20 mg PO HS 20 [History]


Beet Root 1000mg 2,000 mg PO DAILY 20 [History]


Cholecalciferol [Vitamin D3 (25 Mcg = 1000 Iu)] 5,000 unit PO DAILY 20 

[History]


Citalopram Hydrobromide [CeleXA] 20 mg PO DAILY 20 [History]


Donepezil [Aricept] 10 mg PO DAILY 20 [History]


Magnesium Oxide [Mag-Ox] 800 mg PO DAILY 20 [History]


Memantine HCl [Memantine HCl ER] 28 mg PO DAILY 20 [History]


Tamsulosin HCl [Flomax] 0.4 mg PO DAILY 20 [History]


Vitamin B Complex 1 tab PO DAILY 20 [History]


amLODIPine [Norvasc] 5 mg PO DAILY 20 [History]


traZODone  mg PO HS 20 [History]








Follow up Appointment(s)/Referral(s): 


[facility], [NON-STAFF] - 1-2 Days


Randy Melo DO [Primary Care Provider] - 1-2 days


Patient Instructions/Handouts:  Cardiac Rehabilitation (DC), Heart 

Catheterization (DC)


Discharge Disposition: 





- Preliminary Cause of Death


Preliminary Cause of Death: Acute myocardial infarction

## 2022-02-15 NOTE — PN
PROGRESS NOTE



PULMONARY/CRITICAL CARE PROGRESS NOTE:



DATE OF SERVICE:

07/10/2020



Critical care time 33 minutes.



This is a 79-year-old male admitted back on July 2, 2020 for ST-segment elevation

myocardial infarction.  He had a stent placed in his right coronary artery.  He was

also admitted with a diagnosis of MI, CHF, hypoxemia, and atrial fibrillation.  Three

days ago, he had an episode of asystole and cardiopulmonary arrest and was intubated

for this episode on July 7, 2020.  He apparently had a relatively short period of

cardiopulmonary arrest with 7 minutes of cardiopulmonary resuscitation.  He was

intubated and mechanically ventilated for this episode.  He remains on mechanical

ventilator.  Currently, his vent settings are the volume assist-control mode rate of

12, tidal volume 450, FiO2 of 50%, PEEP of 5, blood gases show pO2 of 90, pCO2 of 41

and a pH 7.39.  The patient remains on multiple drips including dobutamine at 5 mcg/kg

per minute, propofol at 40 mcg/kg per minute, heparin via weight-based protocol.

norepinephrine at 20 mcg/minutes, saline at 20 mL an hour and Vital high-protein at 53

with a goal of 53 mL an hour.  His microbiologic studies are thus far negative.  He is

going to have a transesophageal echocardiogram today.



Current vital signs are reviewed, temperature is 98, heart rate is 100, respiratory

rate 16, blood pressure 91/57, saturations are 96%.  Currently is sedated.  Appears in

no acute distress.  He has an orally placed endotracheal tube and NG tube.

HEENT: Examination is grossly unremarkable.

NECK:  Supple, full range of motion.  No adenopathy.  There is a left subclavian triple-

lumen catheter.

CARDIOVASCULAR: Examination reveals regular rhythm and rate.  Heart rate about 100

beats per minute.  S1, S2 normal.  There is a harsh-sounding systolic murmur.  It could

be from aortic stenosis or mitral valve disease or VSD.

LUNGS: Reveal coarse bilateral rhonchi.  Breath sounds are diminished.  Breath sounds

equal bilaterally.

ABDOMEN:  Soft, bowel sounds are noted.

EXTREMITIES:  Intact.  No cyanosis, clubbing, or significant edema.

SKIN: Without rash.

NEUROLOGIC: Examination is difficult to assess because the patient is currently sedated

on propofol.



LABS:

Reviewed.  White count 20.5, hemoglobin 11.1, hematocrit 33.4, platelet count 265,000,

PTT 64.2, blood gases include a pO2 of 90, pCO2 of 41, pH of 7.39.  Sodium 135,

potassium 3.9m, chloride 104, CO2 is 27, anion gap 4, BUN and creatinine were 67 and

1.57.  , ALT is 688.  Albumin 2.9.



Microbiologic studies are thus far negative.



Chest x-ray shows diffuse bilateral infiltrates with bilateral pleural effusions.



Current medications are reviewed.  They were fully reviewed yesterday. Everything is

appropriate.



ASSESSMENT:

1. Status post cardiopulmonary arrest with asystole and 7 minutes of cardiopulmonary

    resuscitation with eventual return of spontaneous circulation, of unclear etiology.

2. Hypoxemic respiratory failure requiring intubation and mechanical ventilation on

    July 7, 2020, with ongoing need for mechanical ventilation.

3. Acute inferior wall ST-segment elevation myocardial infarction, status post

    stenting of the right coronary artery.

4. Acute pulmonary edema secondary to acute systolic congestive heart failure and

    ischemic cardiomyopathy.

5. Benign essential hypertension.

6. Hyperlipidemia.

7. History of mild dementia.

8. Atrial fibrillation with RVR.

9. History of coronary artery disease.

10.History of acute kidney injury.

11.Possible right lower lobe pneumonia, although microbiology has been negative.

12.Loud systolic murmur, of unclear etiology.

13.Congestive hepatopathy.



PLAN:

The patient will have a transesophageal echocardiogram today.  He remains on

dobutamine, propofol and heparin.  We  ill do a daily interruption of sedation.  He

also remains on norepinephrine 20 mcg/minute.  Microbiology has been negative.

Medications are reviewed.  Prognosis is guarded.



Critical care time 33 minutes.





MMODL / IJN: [id number] / Job#: [id number] Wartpeel Counseling:  I discussed with the patient the risks of Wartpeel including but not limited to erythema, scaling, itching, weeping, crusting, and pain.